# Patient Record
Sex: MALE | Race: WHITE | NOT HISPANIC OR LATINO | Employment: FULL TIME | URBAN - METROPOLITAN AREA
[De-identification: names, ages, dates, MRNs, and addresses within clinical notes are randomized per-mention and may not be internally consistent; named-entity substitution may affect disease eponyms.]

---

## 2019-10-03 ENCOUNTER — OFFICE VISIT (OUTPATIENT)
Dept: FAMILY MEDICINE | Facility: CLINIC | Age: 34
End: 2019-10-03
Payer: COMMERCIAL

## 2019-10-03 VITALS
HEIGHT: 71 IN | DIASTOLIC BLOOD PRESSURE: 88 MMHG | HEART RATE: 74 BPM | BODY MASS INDEX: 29.17 KG/M2 | WEIGHT: 208.38 LBS | SYSTOLIC BLOOD PRESSURE: 126 MMHG | OXYGEN SATURATION: 97 %

## 2019-10-03 DIAGNOSIS — K21.9 GASTROESOPHAGEAL REFLUX DISEASE, ESOPHAGITIS PRESENCE NOT SPECIFIED: ICD-10-CM

## 2019-10-03 DIAGNOSIS — F51.01 PRIMARY INSOMNIA: ICD-10-CM

## 2019-10-03 DIAGNOSIS — R00.2 PALPITATIONS: Primary | ICD-10-CM

## 2019-10-03 PROCEDURE — 99214 OFFICE O/P EST MOD 30 MIN: CPT | Mod: S$GLB,,, | Performed by: PHYSICIAN ASSISTANT

## 2019-10-03 PROCEDURE — 99214 PR OFFICE/OUTPT VISIT, EST, LEVL IV, 30-39 MIN: ICD-10-PCS | Mod: S$GLB,,, | Performed by: PHYSICIAN ASSISTANT

## 2019-10-03 RX ORDER — ALPRAZOLAM 0.25 MG/1
TABLET ORAL
Refills: 0 | COMMUNITY
Start: 2019-08-29 | End: 2019-12-12 | Stop reason: SDUPTHER

## 2019-10-03 NOTE — PROGRESS NOTES
"  SUBJECTIVE:    Patient ID: Alejo Zafar is a 33 y.o. male.    Chief Complaint: Irregular Heart Beat; Cough; and Insomnia    34 yo wm presents for regular checkup. He reports that he has had a constellation of sxs that has him concerned. He admits to some irregular HR and palpitations at times. He feels like his heart stops and then he catches himself. Has seen "spots". He does not have any overt CP. Hx of GERD and he knows what this feels like. Also admits that he continues to have trouble sleeping. Has tried and failed several medications. Mild anxiolytic has helped him in the past. Wishes for something to help him "shut his mind off".      No visits with results within 6 Month(s) from this visit.   Latest known visit with results is:   No results found for any previous visit.       Past Medical History:   Diagnosis Date    Hyperlipidemia     Hypertension      Past Surgical History:   Procedure Laterality Date    WISDOM TOOTH EXTRACTION       Family History   Problem Relation Age of Onset    Stroke Mother     Heart disease Maternal Grandfather        Marital Status:   Alcohol History:  reports that he drinks alcohol.  Tobacco History:  reports that he has quit smoking. His smoking use included cigarettes. He quit after 15.00 years of use. He has never used smokeless tobacco.  Drug History:  reports that he has current or past drug history. Drug: Marijuana.    Review of patient's allergies indicates:  No Known Allergies    Current Outpatient Medications:     ALPRAZolam (XANAX) 0.25 MG tablet, TAKE 1 TABLET BY MOUTH TWICE DAILY AS NEEDED FOR 30 DAYS, Disp: , Rfl: 0    omega-3 fatty acids-fish oil 340-1,000 mg Cap, Take 1 capsule by mouth., Disp: , Rfl:     Review of Systems   Constitutional: Negative for activity change, fatigue, fever and unexpected weight change.   HENT: Negative for congestion.    Respiratory: Negative for apnea, cough, chest tightness and shortness of breath.    Cardiovascular: " "Negative for chest pain and palpitations.   Gastrointestinal: Negative for abdominal distention and abdominal pain.   Genitourinary: Negative for difficulty urinating and dysuria.   Musculoskeletal: Negative for arthralgias and back pain.   Neurological: Negative for dizziness and weakness.          Objective:      Vitals:    10/03/19 1118   BP: 126/88   Pulse: 74   SpO2: 97%   Weight: 94.5 kg (208 lb 6.4 oz)   Height: 5' 10.5" (1.791 m)     Physical Exam   Constitutional: He is oriented to person, place, and time. He appears well-developed and well-nourished. No distress.   HENT:   Head: Normocephalic and atraumatic.   Eyes: Pupils are equal, round, and reactive to light.   Neck: Normal range of motion. Neck supple. No thyromegaly present.   Cardiovascular: Normal rate, regular rhythm, normal heart sounds and intact distal pulses.   Pulmonary/Chest: Effort normal and breath sounds normal.   Abdominal: Soft. Bowel sounds are normal. He exhibits no distension. There is no tenderness.   Musculoskeletal: Normal range of motion.   Neurological: He is alert and oriented to person, place, and time. No cranial nerve deficit.   Skin: Skin is warm and dry. No rash noted. No erythema.         Assessment:       1. Palpitations    2. Primary insomnia    3. Gastroesophageal reflux disease, esophagitis presence not specified         Plan:       Palpitations    Primary insomnia    Gastroesophageal reflux disease, esophagitis presence not specified      No follow-ups on file.        10/3/2019 Freddy Arana PA-C    "

## 2019-10-04 ENCOUNTER — TELEPHONE (OUTPATIENT)
Dept: FAMILY MEDICINE | Facility: CLINIC | Age: 34
End: 2019-10-04

## 2019-10-04 LAB
ALBUMIN SERPL-MCNC: 4.7 G/DL (ref 3.6–5.1)
ALBUMIN/GLOB SERPL: 1.6 (CALC) (ref 1–2.5)
ALP SERPL-CCNC: 44 U/L (ref 40–115)
ALT SERPL-CCNC: 22 U/L (ref 9–46)
AST SERPL-CCNC: 17 U/L (ref 10–40)
BASOPHILS # BLD AUTO: 41 CELLS/UL (ref 0–200)
BASOPHILS NFR BLD AUTO: 1 %
BILIRUB SERPL-MCNC: 1.2 MG/DL (ref 0.2–1.2)
BUN SERPL-MCNC: 16 MG/DL (ref 7–25)
BUN/CREAT SERPL: NORMAL (CALC) (ref 6–22)
CALCIUM SERPL-MCNC: 9.7 MG/DL (ref 8.6–10.3)
CHLORIDE SERPL-SCNC: 104 MMOL/L (ref 98–110)
CHOLEST SERPL-MCNC: 174 MG/DL
CHOLEST/HDLC SERPL: 5 (CALC)
CO2 SERPL-SCNC: 29 MMOL/L (ref 20–32)
CREAT SERPL-MCNC: 0.98 MG/DL (ref 0.6–1.35)
EOSINOPHIL # BLD AUTO: 111 CELLS/UL (ref 15–500)
EOSINOPHIL NFR BLD AUTO: 2.7 %
ERYTHROCYTE [DISTWIDTH] IN BLOOD BY AUTOMATED COUNT: 13.4 % (ref 11–15)
GFRSERPLBLD MDRD-ARVRAT: 100 ML/MIN/1.73M2
GLOBULIN SER CALC-MCNC: 3 G/DL (CALC) (ref 1.9–3.7)
GLUCOSE SERPL-MCNC: 92 MG/DL (ref 65–99)
HCT VFR BLD AUTO: 43.3 % (ref 38.5–50)
HDLC SERPL-MCNC: 35 MG/DL
HGB BLD-MCNC: 14.3 G/DL (ref 13.2–17.1)
LDLC SERPL CALC-MCNC: 119 MG/DL (CALC)
LYMPHOCYTES # BLD AUTO: 1222 CELLS/UL (ref 850–3900)
LYMPHOCYTES NFR BLD AUTO: 29.8 %
MCH RBC QN AUTO: 26.7 PG (ref 27–33)
MCHC RBC AUTO-ENTMCNC: 33 G/DL (ref 32–36)
MCV RBC AUTO: 80.8 FL (ref 80–100)
MONOCYTES # BLD AUTO: 361 CELLS/UL (ref 200–950)
MONOCYTES NFR BLD AUTO: 8.8 %
NEUTROPHILS # BLD AUTO: 2366 CELLS/UL (ref 1500–7800)
NEUTROPHILS NFR BLD AUTO: 57.7 %
NONHDLC SERPL-MCNC: 139 MG/DL (CALC)
PLATELET # BLD AUTO: 238 THOUSAND/UL (ref 140–400)
PMV BLD REES-ECKER: 11.6 FL (ref 7.5–12.5)
POTASSIUM SERPL-SCNC: 4.7 MMOL/L (ref 3.5–5.3)
PROT SERPL-MCNC: 7.7 G/DL (ref 6.1–8.1)
RBC # BLD AUTO: 5.36 MILLION/UL (ref 4.2–5.8)
SERVICE CMNT-IMP: ABNORMAL
SODIUM SERPL-SCNC: 141 MMOL/L (ref 135–146)
TRIGL SERPL-MCNC: 101 MG/DL
TSH SERPL-ACNC: 1.78 MIU/L (ref 0.4–4.5)
WBC # BLD AUTO: 4.1 THOUSAND/UL (ref 3.8–10.8)

## 2019-10-04 NOTE — TELEPHONE ENCOUNTER
Notes recorded by Freddy Arana PA-C on 10/4/2019 at 12:41 PM CDT  Labs all look stable at this time. Let me know how your symptoms do with new medication.  No answer.

## 2019-12-12 ENCOUNTER — TELEPHONE (OUTPATIENT)
Dept: FAMILY MEDICINE | Facility: CLINIC | Age: 34
End: 2019-12-12

## 2019-12-12 DIAGNOSIS — F41.9 ANXIETY: Primary | ICD-10-CM

## 2019-12-12 RX ORDER — ALPRAZOLAM 0.25 MG/1
0.25 TABLET ORAL 2 TIMES DAILY PRN
Qty: 180 TABLET | Refills: 1 | Status: SHIPPED | OUTPATIENT
Start: 2019-12-12 | End: 2021-02-11 | Stop reason: SDUPTHER

## 2019-12-12 NOTE — TELEPHONE ENCOUNTER
----- Message from Dior Robbins sent at 12/12/2019  2:20 PM CST -----  Contact: Hung w/ [facility]   Needs the nurse to give him a call back in regards the alprazolam   Jethro's# [phone]

## 2019-12-12 NOTE — TELEPHONE ENCOUNTER
----- Message from Dior Robbins sent at 12/12/2019  9:16 AM CST -----  Contact: Alejo Zafar  Needs refill on ALPRAZolam (XANAX) 0.25 MG tablet  [facility] in [facility]   Pt# [phone]

## 2019-12-12 NOTE — TELEPHONE ENCOUNTER
Contacted Jethro at [facility] pharmacy.  He wanted to verify that we see the pt, because pt lives in [place] but is having meds filled in [place].  Advised Jethro of last visit and advised maybe he works in [place], so that may be more convenient for him.  Jethro is going to contact patient.

## 2020-09-21 ENCOUNTER — OFFICE VISIT (OUTPATIENT)
Dept: OPTOMETRY | Facility: CLINIC | Age: 35
End: 2020-09-21
Payer: COMMERCIAL

## 2020-09-21 DIAGNOSIS — H57.11 EYE PAIN, RIGHT: ICD-10-CM

## 2020-09-21 DIAGNOSIS — H15.101 EPISCLERITIS OF RIGHT EYE: Primary | ICD-10-CM

## 2020-09-21 PROCEDURE — 92002 INTRM OPH EXAM NEW PATIENT: CPT | Mod: S$GLB,,, | Performed by: OPTOMETRIST

## 2020-09-21 PROCEDURE — 99999 PR PBB SHADOW E&M-EST. PATIENT-LVL III: ICD-10-PCS | Mod: PBBFAC,,, | Performed by: OPTOMETRIST

## 2020-09-21 PROCEDURE — 99999 PR PBB SHADOW E&M-EST. PATIENT-LVL III: CPT | Mod: PBBFAC,,, | Performed by: OPTOMETRIST

## 2020-09-21 PROCEDURE — 92002 PR EYE EXAM, NEW PATIENT,INTERMED: ICD-10-PCS | Mod: S$GLB,,, | Performed by: OPTOMETRIST

## 2020-09-21 NOTE — PATIENT INSTRUCTIONS
Mr. Zafar presents with signs/symptoms in the right eye consistent with episcleritis.  No discharge.  No pre-auricular swelling of lymph node.    Discussed management.  Suggest take Aleve or Ibuprofen by mouth as directed on package for ten to fourteen days, and use of an OTC artificial tear eyedrop in the right eye as needed throughout the day.  Call/return in ten to fourteen days if signs/symptoms not resolved - or prior, if any apparent exacerbation of signs/symptoms.     Defer decision for use of a topical steroid drop.

## 2020-09-21 NOTE — PROGRESS NOTES
HPI     Eye Pain      Additional comments: redness of inferior conj od and not clearing   x2w/now having soreness of inferior aspect of eye              Comments     Patient's age: 34 y.o. WM   Occupation: [employer]  Approximate date of last eye examination:    Name of last eye doctor seen:   City/State:   Wears glasses? No      If yes, wears  Full-time or part-time?    Present glasses are: Bifocal, SV Distance, SV Reading?    Approximate age of present glasses:     Got new glasses following last exam, or subsequently?:     Any problem with VA with glasses?  Blur va OD   Wears CLs?:  No            If yes:              Type of CL worn:                Wears full-time or part-time:                 Sleeps with contact lenses:                 CL Solution used:                 How often replace CLs:                Any problem with VA with CLs?                Has patient read and signed the contact lens fee information   document?   Headaches?  No   Eye pain/discomfort?  More tender                                                                                      Flashes?  No   Floaters?  No   Diplopia/Double vision?  No   Patient's Ocular History:         Any eye surgeries? No          Any eye injury?  Childhood injury          Any treatment for eye disease?  No   Family history of eye disease?  MGM-Glaucoma   Significant patient medical history:         1. Diabetes?  No        If yes, IDDM or NIDDM?    2. HBP?  No               3. Other (describe):  No    ! OTC eyedrops currently using:  No    ! Prescription eye meds currently using:  No    ! Any history of allergy/adverse reaction to any eye meds used   previously?  N/a     ! Any history of allergy/adverse reaction to eyedrops used during prior   eye exam(s)? n/a    ! Any history of allergy/adverse reaction to Novacaine or similar meds?   n/a   ! Any history of allergy/adverse reaction to Epinephrine or similar meds?   N/a     ! Patient okay with use of anesthetic  eyedrops to check eye pressure?    Yes         ! Patient okay with use of eyedrops to dilate pupils today?  Yes    !  Allergies/Medications/Medical History/Family History reviewed today?    yes      PD =     Desired reading distance =    Approx computer distance =                                                                     Last edited by Kristofer Kern, OD on 9/21/2020  4:05 PM. (History)            Assessment /Plan     For exam results, see Encounter Report.    1. Episcleritis of right eye     2. Eye pain, right                    Mr. Zafar presents with signs/symptoms in the right eye consistent with episcleritis.  No discharge.  No pre-auricular swelling of lymph node.    Discussed management.  Suggest take Aleve or Ibuprofen by mouth as directed on package for ten to fourteen days, and use of an OTC artificial tear eyedrop in the right eye as needed throughout the day.  Call/return in ten to fourteen days if signs/symptoms not resolved - or prior, if any apparent exacerbation of signs/symptoms.     Defer decision for use of a topical steroid drop.

## 2021-02-08 ENCOUNTER — TELEPHONE (OUTPATIENT)
Dept: FAMILY MEDICINE | Facility: CLINIC | Age: 36
End: 2021-02-08

## 2021-02-08 DIAGNOSIS — F41.9 ANXIETY: ICD-10-CM

## 2021-02-08 RX ORDER — ALPRAZOLAM 0.25 MG/1
0.25 TABLET ORAL 2 TIMES DAILY PRN
Qty: 180 TABLET | Refills: 1 | Status: CANCELLED | OUTPATIENT
Start: 2021-02-08 | End: 2021-08-07

## 2021-02-11 ENCOUNTER — OFFICE VISIT (OUTPATIENT)
Dept: INTERNAL MEDICINE | Facility: CLINIC | Age: 36
End: 2021-02-11
Payer: COMMERCIAL

## 2021-02-11 ENCOUNTER — LAB VISIT (OUTPATIENT)
Dept: LAB | Facility: HOSPITAL | Age: 36
End: 2021-02-11
Attending: INTERNAL MEDICINE
Payer: COMMERCIAL

## 2021-02-11 VITALS
SYSTOLIC BLOOD PRESSURE: 122 MMHG | BODY MASS INDEX: 29.05 KG/M2 | WEIGHT: 202.94 LBS | OXYGEN SATURATION: 97 % | DIASTOLIC BLOOD PRESSURE: 76 MMHG | HEART RATE: 75 BPM | HEIGHT: 70 IN

## 2021-02-11 DIAGNOSIS — Z00.00 ANNUAL PHYSICAL EXAM: ICD-10-CM

## 2021-02-11 DIAGNOSIS — G47.01 INSOMNIA DUE TO MEDICAL CONDITION: ICD-10-CM

## 2021-02-11 DIAGNOSIS — Z00.00 ANNUAL PHYSICAL EXAM: Primary | ICD-10-CM

## 2021-02-11 DIAGNOSIS — R00.2 PALPITATIONS: ICD-10-CM

## 2021-02-11 DIAGNOSIS — F42.8 OBSESSIVE THINKING: ICD-10-CM

## 2021-02-11 DIAGNOSIS — F41.9 ANXIETY: ICD-10-CM

## 2021-02-11 LAB
ALBUMIN SERPL BCP-MCNC: 4.6 G/DL (ref 3.5–5.2)
ALP SERPL-CCNC: 45 U/L (ref 55–135)
ALT SERPL W/O P-5'-P-CCNC: 23 U/L (ref 10–44)
ANION GAP SERPL CALC-SCNC: 9 MMOL/L (ref 8–16)
AST SERPL-CCNC: 18 U/L (ref 10–40)
BASOPHILS # BLD AUTO: 0.05 K/UL (ref 0–0.2)
BASOPHILS NFR BLD: 0.9 % (ref 0–1.9)
BILIRUB SERPL-MCNC: 1.8 MG/DL (ref 0.1–1)
BUN SERPL-MCNC: 9 MG/DL (ref 6–20)
CALCIUM SERPL-MCNC: 9.4 MG/DL (ref 8.7–10.5)
CHLORIDE SERPL-SCNC: 103 MMOL/L (ref 95–110)
CHOLEST SERPL-MCNC: 201 MG/DL (ref 120–199)
CHOLEST/HDLC SERPL: 6.9 {RATIO} (ref 2–5)
CO2 SERPL-SCNC: 27 MMOL/L (ref 23–29)
CREAT SERPL-MCNC: 1.1 MG/DL (ref 0.5–1.4)
DIFFERENTIAL METHOD: ABNORMAL
EOSINOPHIL # BLD AUTO: 0 K/UL (ref 0–0.5)
EOSINOPHIL NFR BLD: 0 % (ref 0–8)
ERYTHROCYTE [DISTWIDTH] IN BLOOD BY AUTOMATED COUNT: 14.6 % (ref 11.5–14.5)
EST. GFR  (AFRICAN AMERICAN): >60 ML/MIN/1.73 M^2
EST. GFR  (NON AFRICAN AMERICAN): >60 ML/MIN/1.73 M^2
GLUCOSE SERPL-MCNC: 86 MG/DL (ref 70–110)
HCT VFR BLD AUTO: 42.9 % (ref 40–54)
HDLC SERPL-MCNC: 29 MG/DL (ref 40–75)
HDLC SERPL: 14.4 % (ref 20–50)
HGB BLD-MCNC: 14.1 G/DL (ref 14–18)
IMM GRANULOCYTES # BLD AUTO: 0.02 K/UL (ref 0–0.04)
IMM GRANULOCYTES NFR BLD AUTO: 0.4 % (ref 0–0.5)
LDLC SERPL CALC-MCNC: 141.8 MG/DL (ref 63–159)
LYMPHOCYTES # BLD AUTO: 1.1 K/UL (ref 1–4.8)
LYMPHOCYTES NFR BLD: 19.5 % (ref 18–48)
MCH RBC QN AUTO: 26.1 PG (ref 27–31)
MCHC RBC AUTO-ENTMCNC: 32.9 G/DL (ref 32–36)
MCV RBC AUTO: 79 FL (ref 82–98)
MONOCYTES # BLD AUTO: 0.4 K/UL (ref 0.3–1)
MONOCYTES NFR BLD: 7.5 % (ref 4–15)
NEUTROPHILS # BLD AUTO: 3.9 K/UL (ref 1.8–7.7)
NEUTROPHILS NFR BLD: 71.7 % (ref 38–73)
NONHDLC SERPL-MCNC: 172 MG/DL
NRBC BLD-RTO: 0 /100 WBC
PLATELET # BLD AUTO: 270 K/UL (ref 150–350)
PMV BLD AUTO: 12.4 FL (ref 9.2–12.9)
POTASSIUM SERPL-SCNC: 4.1 MMOL/L (ref 3.5–5.1)
PROT SERPL-MCNC: 7.9 G/DL (ref 6–8.4)
RBC # BLD AUTO: 5.4 M/UL (ref 4.6–6.2)
SODIUM SERPL-SCNC: 139 MMOL/L (ref 136–145)
TRIGL SERPL-MCNC: 151 MG/DL (ref 30–150)
WBC # BLD AUTO: 5.48 K/UL (ref 3.9–12.7)

## 2021-02-11 PROCEDURE — 99385 PR PREVENTIVE VISIT,NEW,18-39: ICD-10-PCS | Mod: S$GLB,,, | Performed by: INTERNAL MEDICINE

## 2021-02-11 PROCEDURE — 1126F PR PAIN SEVERITY QUANTIFIED, NO PAIN PRESENT: ICD-10-PCS | Mod: S$GLB,,, | Performed by: INTERNAL MEDICINE

## 2021-02-11 PROCEDURE — 80053 COMPREHEN METABOLIC PANEL: CPT

## 2021-02-11 PROCEDURE — 99385 PREV VISIT NEW AGE 18-39: CPT | Mod: S$GLB,,, | Performed by: INTERNAL MEDICINE

## 2021-02-11 PROCEDURE — 80061 LIPID PANEL: CPT

## 2021-02-11 PROCEDURE — 3008F PR BODY MASS INDEX (BMI) DOCUMENTED: ICD-10-PCS | Mod: CPTII,S$GLB,, | Performed by: INTERNAL MEDICINE

## 2021-02-11 PROCEDURE — 36415 COLL VENOUS BLD VENIPUNCTURE: CPT

## 2021-02-11 PROCEDURE — 3008F BODY MASS INDEX DOCD: CPT | Mod: CPTII,S$GLB,, | Performed by: INTERNAL MEDICINE

## 2021-02-11 PROCEDURE — 1126F AMNT PAIN NOTED NONE PRSNT: CPT | Mod: S$GLB,,, | Performed by: INTERNAL MEDICINE

## 2021-02-11 PROCEDURE — 99999 PR PBB SHADOW E&M-EST. PATIENT-LVL IV: CPT | Mod: PBBFAC,,, | Performed by: INTERNAL MEDICINE

## 2021-02-11 PROCEDURE — 85025 COMPLETE CBC W/AUTO DIFF WBC: CPT

## 2021-02-11 PROCEDURE — 99999 PR PBB SHADOW E&M-EST. PATIENT-LVL IV: ICD-10-PCS | Mod: PBBFAC,,, | Performed by: INTERNAL MEDICINE

## 2021-02-11 RX ORDER — ALPRAZOLAM 0.25 MG/1
0.25 TABLET ORAL 2 TIMES DAILY PRN
Qty: 30 TABLET | Refills: 1 | Status: SHIPPED | OUTPATIENT
Start: 2021-02-11 | End: 2021-09-14 | Stop reason: SDUPTHER

## 2021-03-25 ENCOUNTER — LAB VISIT (OUTPATIENT)
Dept: INTERNAL MEDICINE | Facility: CLINIC | Age: 36
End: 2021-03-25
Payer: COMMERCIAL

## 2021-03-25 ENCOUNTER — OFFICE VISIT (OUTPATIENT)
Dept: INTERNAL MEDICINE | Facility: CLINIC | Age: 36
End: 2021-03-25
Payer: COMMERCIAL

## 2021-03-25 DIAGNOSIS — B34.9 VIRAL ILLNESS: ICD-10-CM

## 2021-03-25 DIAGNOSIS — R50.9 FEVER, UNSPECIFIED FEVER CAUSE: ICD-10-CM

## 2021-03-25 DIAGNOSIS — R50.9 FEVER, UNSPECIFIED FEVER CAUSE: Primary | ICD-10-CM

## 2021-03-25 PROCEDURE — U0005 INFEC AGEN DETEC AMPLI PROBE: HCPCS | Performed by: INTERNAL MEDICINE

## 2021-03-25 PROCEDURE — U0003 INFECTIOUS AGENT DETECTION BY NUCLEIC ACID (DNA OR RNA); SEVERE ACUTE RESPIRATORY SYNDROME CORONAVIRUS 2 (SARS-COV-2) (CORONAVIRUS DISEASE [COVID-19]), AMPLIFIED PROBE TECHNIQUE, MAKING USE OF HIGH THROUGHPUT TECHNOLOGIES AS DESCRIBED BY CMS-2020-01-R: HCPCS | Performed by: INTERNAL MEDICINE

## 2021-03-25 PROCEDURE — 99213 PR OFFICE/OUTPT VISIT, EST, LEVL III, 20-29 MIN: ICD-10-PCS | Mod: 95,,, | Performed by: INTERNAL MEDICINE

## 2021-03-25 PROCEDURE — 99213 OFFICE O/P EST LOW 20 MIN: CPT | Mod: 95,,, | Performed by: INTERNAL MEDICINE

## 2021-03-26 LAB — SARS-COV-2 RNA RESP QL NAA+PROBE: NOT DETECTED

## 2021-05-10 ENCOUNTER — PATIENT MESSAGE (OUTPATIENT)
Dept: RESEARCH | Facility: HOSPITAL | Age: 36
End: 2021-05-10

## 2021-06-09 ENCOUNTER — OFFICE VISIT (OUTPATIENT)
Dept: PODIATRY | Facility: CLINIC | Age: 36
End: 2021-06-09
Payer: COMMERCIAL

## 2021-06-09 VITALS
HEART RATE: 72 BPM | DIASTOLIC BLOOD PRESSURE: 74 MMHG | SYSTOLIC BLOOD PRESSURE: 120 MMHG | HEIGHT: 70 IN | BODY MASS INDEX: 29.12 KG/M2

## 2021-06-09 DIAGNOSIS — L60.0 INGROWN NAIL: Primary | ICD-10-CM

## 2021-06-09 PROCEDURE — 3008F BODY MASS INDEX DOCD: CPT | Mod: CPTII,S$GLB,, | Performed by: PODIATRIST

## 2021-06-09 PROCEDURE — 1126F PR PAIN SEVERITY QUANTIFIED, NO PAIN PRESENT: ICD-10-PCS | Mod: S$GLB,,, | Performed by: PODIATRIST

## 2021-06-09 PROCEDURE — 3008F PR BODY MASS INDEX (BMI) DOCUMENTED: ICD-10-PCS | Mod: CPTII,S$GLB,, | Performed by: PODIATRIST

## 2021-06-09 PROCEDURE — 1126F AMNT PAIN NOTED NONE PRSNT: CPT | Mod: S$GLB,,, | Performed by: PODIATRIST

## 2021-06-09 PROCEDURE — 99203 PR OFFICE/OUTPT VISIT, NEW, LEVL III, 30-44 MIN: ICD-10-PCS | Mod: S$GLB,,, | Performed by: PODIATRIST

## 2021-06-09 PROCEDURE — 99999 PR PBB SHADOW E&M-EST. PATIENT-LVL III: CPT | Mod: PBBFAC,,, | Performed by: PODIATRIST

## 2021-06-09 PROCEDURE — 99203 OFFICE O/P NEW LOW 30 MIN: CPT | Mod: S$GLB,,, | Performed by: PODIATRIST

## 2021-06-09 PROCEDURE — 99999 PR PBB SHADOW E&M-EST. PATIENT-LVL III: ICD-10-PCS | Mod: PBBFAC,,, | Performed by: PODIATRIST

## 2021-06-10 ENCOUNTER — TELEPHONE (OUTPATIENT)
Dept: PODIATRY | Facility: CLINIC | Age: 36
End: 2021-06-10

## 2021-06-17 ENCOUNTER — TELEPHONE (OUTPATIENT)
Dept: PODIATRY | Facility: CLINIC | Age: 36
End: 2021-06-17

## 2021-07-13 ENCOUNTER — TELEPHONE (OUTPATIENT)
Dept: PODIATRY | Facility: CLINIC | Age: 36
End: 2021-07-13

## 2021-07-14 ENCOUNTER — PROCEDURE VISIT (OUTPATIENT)
Dept: PODIATRY | Facility: CLINIC | Age: 36
End: 2021-07-14
Payer: COMMERCIAL

## 2021-07-14 VITALS
HEIGHT: 70 IN | DIASTOLIC BLOOD PRESSURE: 84 MMHG | BODY MASS INDEX: 29.12 KG/M2 | SYSTOLIC BLOOD PRESSURE: 144 MMHG | HEART RATE: 72 BPM

## 2021-07-14 DIAGNOSIS — L60.0 INGROWN NAIL: Primary | ICD-10-CM

## 2021-07-22 ENCOUNTER — OFFICE VISIT (OUTPATIENT)
Dept: PODIATRY | Facility: CLINIC | Age: 36
End: 2021-07-22
Payer: COMMERCIAL

## 2021-07-22 VITALS
BODY MASS INDEX: 29.1 KG/M2 | WEIGHT: 202.81 LBS | SYSTOLIC BLOOD PRESSURE: 120 MMHG | HEART RATE: 80 BPM | DIASTOLIC BLOOD PRESSURE: 78 MMHG

## 2021-07-22 DIAGNOSIS — L60.0 INGROWN NAIL: Primary | ICD-10-CM

## 2021-07-22 PROCEDURE — 99024 PR POST-OP FOLLOW-UP VISIT: ICD-10-PCS | Mod: S$GLB,,, | Performed by: PODIATRIST

## 2021-07-22 PROCEDURE — 99999 PR PBB SHADOW E&M-EST. PATIENT-LVL III: CPT | Mod: PBBFAC,,, | Performed by: PODIATRIST

## 2021-07-22 PROCEDURE — 3078F DIAST BP <80 MM HG: CPT | Mod: CPTII,S$GLB,, | Performed by: PODIATRIST

## 2021-07-22 PROCEDURE — 3008F BODY MASS INDEX DOCD: CPT | Mod: CPTII,S$GLB,, | Performed by: PODIATRIST

## 2021-07-22 PROCEDURE — 3074F SYST BP LT 130 MM HG: CPT | Mod: CPTII,S$GLB,, | Performed by: PODIATRIST

## 2021-07-22 PROCEDURE — 3008F PR BODY MASS INDEX (BMI) DOCUMENTED: ICD-10-PCS | Mod: CPTII,S$GLB,, | Performed by: PODIATRIST

## 2021-07-22 PROCEDURE — 99024 POSTOP FOLLOW-UP VISIT: CPT | Mod: S$GLB,,, | Performed by: PODIATRIST

## 2021-07-22 PROCEDURE — 99999 PR PBB SHADOW E&M-EST. PATIENT-LVL III: ICD-10-PCS | Mod: PBBFAC,,, | Performed by: PODIATRIST

## 2021-07-22 PROCEDURE — 3078F PR MOST RECENT DIASTOLIC BLOOD PRESSURE < 80 MM HG: ICD-10-PCS | Mod: CPTII,S$GLB,, | Performed by: PODIATRIST

## 2021-07-22 PROCEDURE — 3074F PR MOST RECENT SYSTOLIC BLOOD PRESSURE < 130 MM HG: ICD-10-PCS | Mod: CPTII,S$GLB,, | Performed by: PODIATRIST

## 2021-07-30 ENCOUNTER — OFFICE VISIT (OUTPATIENT)
Dept: DERMATOLOGY | Facility: CLINIC | Age: 36
End: 2021-07-30
Payer: COMMERCIAL

## 2021-07-30 DIAGNOSIS — D22.9 ATYPICAL NEVUS: ICD-10-CM

## 2021-07-30 DIAGNOSIS — D22.9 MULTIPLE BENIGN NEVI: ICD-10-CM

## 2021-07-30 DIAGNOSIS — L73.9 FOLLICULITIS: ICD-10-CM

## 2021-07-30 DIAGNOSIS — D18.01 CHERRY ANGIOMA: ICD-10-CM

## 2021-07-30 DIAGNOSIS — A63.0 GENITAL WARTS: Primary | ICD-10-CM

## 2021-07-30 DIAGNOSIS — Z12.83 SCREENING EXAM FOR SKIN CANCER: ICD-10-CM

## 2021-07-30 PROCEDURE — 1159F MED LIST DOCD IN RCRD: CPT | Mod: CPTII,S$GLB,, | Performed by: DERMATOLOGY

## 2021-07-30 PROCEDURE — 54056 CRYOSURGERY PENIS LESION(S): CPT | Mod: S$GLB,,, | Performed by: DERMATOLOGY

## 2021-07-30 PROCEDURE — 1159F PR MEDICATION LIST DOCUMENTED IN MEDICAL RECORD: ICD-10-PCS | Mod: CPTII,S$GLB,, | Performed by: DERMATOLOGY

## 2021-07-30 PROCEDURE — 99999 PR PBB SHADOW E&M-EST. PATIENT-LVL III: ICD-10-PCS | Mod: PBBFAC,,, | Performed by: DERMATOLOGY

## 2021-07-30 PROCEDURE — 54056 PR DESTR PENIS LESN,SIMPL,CRYOSURG: ICD-10-PCS | Mod: S$GLB,,, | Performed by: DERMATOLOGY

## 2021-07-30 PROCEDURE — 1160F RVW MEDS BY RX/DR IN RCRD: CPT | Mod: CPTII,S$GLB,, | Performed by: DERMATOLOGY

## 2021-07-30 PROCEDURE — 1160F PR REVIEW ALL MEDS BY PRESCRIBER/CLIN PHARMACIST DOCUMENTED: ICD-10-PCS | Mod: CPTII,S$GLB,, | Performed by: DERMATOLOGY

## 2021-07-30 PROCEDURE — 99204 OFFICE O/P NEW MOD 45 MIN: CPT | Mod: 25,S$GLB,, | Performed by: DERMATOLOGY

## 2021-07-30 PROCEDURE — 1126F PR PAIN SEVERITY QUANTIFIED, NO PAIN PRESENT: ICD-10-PCS | Mod: CPTII,S$GLB,, | Performed by: DERMATOLOGY

## 2021-07-30 PROCEDURE — 1126F AMNT PAIN NOTED NONE PRSNT: CPT | Mod: CPTII,S$GLB,, | Performed by: DERMATOLOGY

## 2021-07-30 PROCEDURE — 99999 PR PBB SHADOW E&M-EST. PATIENT-LVL III: CPT | Mod: PBBFAC,,, | Performed by: DERMATOLOGY

## 2021-07-30 PROCEDURE — 99204 PR OFFICE/OUTPT VISIT, NEW, LEVL IV, 45-59 MIN: ICD-10-PCS | Mod: 25,S$GLB,, | Performed by: DERMATOLOGY

## 2021-07-30 RX ORDER — CLINDAMYCIN PHOSPHATE 11.9 MG/ML
SOLUTION TOPICAL
Qty: 60 ML | Refills: 3 | Status: SHIPPED | OUTPATIENT
Start: 2021-07-30 | End: 2021-09-14

## 2021-07-30 RX ORDER — IMIQUIMOD 12.5 MG/.25G
CREAM TOPICAL
Qty: 24 PACKET | Refills: 2 | Status: SHIPPED | OUTPATIENT
Start: 2021-07-30 | End: 2021-09-14

## 2021-09-14 ENCOUNTER — OFFICE VISIT (OUTPATIENT)
Dept: FAMILY MEDICINE | Facility: CLINIC | Age: 36
End: 2021-09-14
Payer: COMMERCIAL

## 2021-09-14 VITALS
WEIGHT: 215 LBS | HEIGHT: 70 IN | BODY MASS INDEX: 30.78 KG/M2 | HEART RATE: 100 BPM | DIASTOLIC BLOOD PRESSURE: 70 MMHG | SYSTOLIC BLOOD PRESSURE: 138 MMHG

## 2021-09-14 DIAGNOSIS — R07.89 SENSATION OF CHEST TIGHTNESS: Primary | ICD-10-CM

## 2021-09-14 DIAGNOSIS — F41.9 ANXIETY: ICD-10-CM

## 2021-09-14 DIAGNOSIS — Z00.00 ROUTINE PHYSICAL EXAMINATION: ICD-10-CM

## 2021-09-14 PROCEDURE — 1160F RVW MEDS BY RX/DR IN RCRD: CPT | Mod: S$GLB,,, | Performed by: PHYSICIAN ASSISTANT

## 2021-09-14 PROCEDURE — 3008F PR BODY MASS INDEX (BMI) DOCUMENTED: ICD-10-PCS | Mod: S$GLB,,, | Performed by: PHYSICIAN ASSISTANT

## 2021-09-14 PROCEDURE — 99214 OFFICE O/P EST MOD 30 MIN: CPT | Mod: S$GLB,,, | Performed by: PHYSICIAN ASSISTANT

## 2021-09-14 PROCEDURE — 99214 PR OFFICE/OUTPT VISIT, EST, LEVL IV, 30-39 MIN: ICD-10-PCS | Mod: S$GLB,,, | Performed by: PHYSICIAN ASSISTANT

## 2021-09-14 PROCEDURE — 3008F BODY MASS INDEX DOCD: CPT | Mod: S$GLB,,, | Performed by: PHYSICIAN ASSISTANT

## 2021-09-14 PROCEDURE — 1160F PR REVIEW ALL MEDS BY PRESCRIBER/CLIN PHARMACIST DOCUMENTED: ICD-10-PCS | Mod: S$GLB,,, | Performed by: PHYSICIAN ASSISTANT

## 2021-09-14 RX ORDER — SERTRALINE HYDROCHLORIDE 25 MG/1
25 TABLET, FILM COATED ORAL DAILY
Qty: 30 TABLET | Refills: 2 | Status: SHIPPED | OUTPATIENT
Start: 2021-09-14 | End: 2021-12-23 | Stop reason: SDUPTHER

## 2021-09-14 RX ORDER — ALPRAZOLAM 0.25 MG/1
0.25 TABLET ORAL 2 TIMES DAILY PRN
Qty: 30 TABLET | Refills: 2 | Status: SHIPPED | OUTPATIENT
Start: 2021-09-14 | End: 2021-12-15 | Stop reason: SDUPTHER

## 2021-09-16 ENCOUNTER — TELEPHONE (OUTPATIENT)
Dept: CARDIOLOGY | Facility: HOSPITAL | Age: 36
End: 2021-09-16

## 2021-09-17 ENCOUNTER — CLINICAL SUPPORT (OUTPATIENT)
Dept: CARDIOLOGY | Facility: HOSPITAL | Age: 36
End: 2021-09-17
Attending: PHYSICIAN ASSISTANT
Payer: COMMERCIAL

## 2021-09-17 VITALS — WEIGHT: 213 LBS | HEIGHT: 71 IN | BODY MASS INDEX: 29.82 KG/M2

## 2021-09-17 DIAGNOSIS — F41.9 ANXIETY: ICD-10-CM

## 2021-09-17 DIAGNOSIS — R07.89 SENSATION OF CHEST TIGHTNESS: ICD-10-CM

## 2021-09-17 LAB
CV STRESS BASE HR: 79 BPM
DIASTOLIC BLOOD PRESSURE: 85 MMHG
OHS CV CPX 1 MINUTE RECOVERY HEART RATE: 147 BPM
OHS CV CPX 85 PERCENT MAX PREDICTED HEART RATE MALE: 156
OHS CV CPX ESTIMATED METS: 10.3
OHS CV CPX MAX PREDICTED HEART RATE: 184
OHS CV CPX PATIENT IS FEMALE: 0
OHS CV CPX PATIENT IS MALE: 1
OHS CV CPX PEAK DIASTOLIC BLOOD PRESSURE: 74 MMHG
OHS CV CPX PEAK HEAR RATE: 162 BPM
OHS CV CPX PEAK RATE PRESSURE PRODUCT: NORMAL
OHS CV CPX PEAK SYSTOLIC BLOOD PRESSURE: 201 MMHG
OHS CV CPX PERCENT MAX PREDICTED HEART RATE ACHIEVED: 88
OHS CV CPX RATE PRESSURE PRODUCT PRESENTING: NORMAL
STRESS ECHO POST EXERCISE DUR MIN: 9 MINUTES
STRESS ECHO POST EXERCISE DUR SEC: 0 SECONDS
SYSTOLIC BLOOD PRESSURE: 131 MMHG

## 2021-09-17 PROCEDURE — 93017 CV STRESS TEST TRACING ONLY: CPT

## 2021-09-17 PROCEDURE — 93018 EXERCISE STRESS - EKG (CUPID ONLY): ICD-10-PCS | Mod: ,,, | Performed by: INTERNAL MEDICINE

## 2021-09-17 PROCEDURE — 93016 CV STRESS TEST SUPVJ ONLY: CPT | Mod: ,,, | Performed by: INTERNAL MEDICINE

## 2021-09-17 PROCEDURE — 93018 CV STRESS TEST I&R ONLY: CPT | Mod: ,,, | Performed by: INTERNAL MEDICINE

## 2021-09-17 PROCEDURE — 93016 EXERCISE STRESS - EKG (CUPID ONLY): ICD-10-PCS | Mod: ,,, | Performed by: INTERNAL MEDICINE

## 2021-10-08 ENCOUNTER — PATIENT MESSAGE (OUTPATIENT)
Dept: FAMILY MEDICINE | Facility: CLINIC | Age: 36
End: 2021-10-08

## 2021-10-16 ENCOUNTER — PATIENT MESSAGE (OUTPATIENT)
Dept: FAMILY MEDICINE | Facility: CLINIC | Age: 36
End: 2021-10-16
Payer: COMMERCIAL

## 2021-12-03 ENCOUNTER — PATIENT MESSAGE (OUTPATIENT)
Dept: FAMILY MEDICINE | Facility: CLINIC | Age: 36
End: 2021-12-03
Payer: COMMERCIAL

## 2021-12-03 DIAGNOSIS — N52.9 ERECTILE DYSFUNCTION, UNSPECIFIED ERECTILE DYSFUNCTION TYPE: Primary | ICD-10-CM

## 2021-12-03 RX ORDER — TADALAFIL 20 MG/1
20 TABLET ORAL DAILY
Qty: 12 TABLET | Refills: 2 | Status: SHIPPED | OUTPATIENT
Start: 2021-12-03 | End: 2022-04-18 | Stop reason: SDUPTHER

## 2021-12-07 ENCOUNTER — PATIENT MESSAGE (OUTPATIENT)
Dept: DERMATOLOGY | Facility: CLINIC | Age: 36
End: 2021-12-07
Payer: COMMERCIAL

## 2021-12-13 ENCOUNTER — TELEPHONE (OUTPATIENT)
Dept: FAMILY MEDICINE | Facility: CLINIC | Age: 36
End: 2021-12-13
Payer: COMMERCIAL

## 2021-12-23 ENCOUNTER — OFFICE VISIT (OUTPATIENT)
Dept: FAMILY MEDICINE | Facility: CLINIC | Age: 36
End: 2021-12-23

## 2021-12-23 ENCOUNTER — TELEPHONE (OUTPATIENT)
Dept: FAMILY MEDICINE | Facility: CLINIC | Age: 36
End: 2021-12-23

## 2021-12-23 DIAGNOSIS — H91.90 HEARING LOSS, UNSPECIFIED HEARING LOSS TYPE, UNSPECIFIED LATERALITY: ICD-10-CM

## 2021-12-23 DIAGNOSIS — Z00.00 ROUTINE PHYSICAL EXAMINATION: ICD-10-CM

## 2021-12-23 DIAGNOSIS — K64.9 HEMORRHOIDS, UNSPECIFIED HEMORRHOID TYPE: ICD-10-CM

## 2021-12-23 DIAGNOSIS — F41.9 ANXIETY: Primary | ICD-10-CM

## 2021-12-23 DIAGNOSIS — N52.9 ERECTILE DYSFUNCTION, UNSPECIFIED ERECTILE DYSFUNCTION TYPE: ICD-10-CM

## 2021-12-23 PROCEDURE — 99214 OFFICE O/P EST MOD 30 MIN: CPT | Mod: 95,,, | Performed by: PHYSICIAN ASSISTANT

## 2021-12-23 PROCEDURE — 99214 PR OFFICE/OUTPT VISIT, EST, LEVL IV, 30-39 MIN: ICD-10-PCS | Mod: 95,,, | Performed by: PHYSICIAN ASSISTANT

## 2021-12-23 RX ORDER — SERTRALINE HYDROCHLORIDE 25 MG/1
25 TABLET, FILM COATED ORAL DAILY
Qty: 30 TABLET | Refills: 5 | Status: SHIPPED | OUTPATIENT
Start: 2021-12-23 | End: 2022-05-25 | Stop reason: ALTCHOICE

## 2021-12-23 RX ORDER — HYDROCORTISONE ACETATE PRAMOXINE HCL 1; 1 G/100G; G/100G
CREAM TOPICAL 3 TIMES DAILY
Qty: 28.4 G | Refills: 1 | Status: SHIPPED | OUTPATIENT
Start: 2021-12-23 | End: 2022-01-22

## 2021-12-30 ENCOUNTER — PATIENT MESSAGE (OUTPATIENT)
Dept: FAMILY MEDICINE | Facility: CLINIC | Age: 36
End: 2021-12-30
Payer: COMMERCIAL

## 2021-12-30 DIAGNOSIS — R06.83 SNORING: Primary | ICD-10-CM

## 2022-01-02 ENCOUNTER — PATIENT MESSAGE (OUTPATIENT)
Dept: FAMILY MEDICINE | Facility: CLINIC | Age: 37
End: 2022-01-02
Payer: COMMERCIAL

## 2022-01-03 ENCOUNTER — PATIENT MESSAGE (OUTPATIENT)
Dept: FAMILY MEDICINE | Facility: CLINIC | Age: 37
End: 2022-01-03
Payer: COMMERCIAL

## 2022-01-04 ENCOUNTER — TELEPHONE (OUTPATIENT)
Dept: FAMILY MEDICINE | Facility: CLINIC | Age: 37
End: 2022-01-04
Payer: COMMERCIAL

## 2022-01-04 NOTE — TELEPHONE ENCOUNTER
----- Message from Freddy Arana PA-C sent at 1/4/2022  1:39 PM CST -----  Your labs all look stable except for liver enzymes and cholesterol.  Liver elevations in the current ratios support an increase in alcohol use.  I would advise to significantly cut back on this.  We can recheck a CMP and lipid panel in 3 months

## 2022-01-04 NOTE — TELEPHONE ENCOUNTER
havent heard of any cbd associated liver issues. Just advise to watch the alcohol use and recheck cmp/lipid in 8 weeks. We would consider imaging/further labs if remains high.

## 2022-01-04 NOTE — TELEPHONE ENCOUNTER
Spoke to pt verbatim per federico. Pt voiced understanding. Pt states he only heavily drinks one day a week and did not realize that it could affect his liver. Pt also would like to know if CBD gummies could cause liver problems. Please advise. Remind me set.

## 2022-01-05 LAB
ALBUMIN SERPL-MCNC: 4.5 G/DL (ref 3.6–5.1)
ALBUMIN/GLOB SERPL: 1.7 (CALC) (ref 1–2.5)
ALP SERPL-CCNC: 43 U/L (ref 36–130)
ALT SERPL-CCNC: 65 U/L (ref 9–46)
APPEARANCE UR: CLEAR
AST SERPL-CCNC: 111 U/L (ref 10–40)
BACTERIA #/AREA URNS HPF: NORMAL /HPF
BACTERIA UR CULT: NORMAL
BASOPHILS # BLD AUTO: 39 CELLS/UL (ref 0–200)
BASOPHILS NFR BLD AUTO: 0.9 %
BILIRUB SERPL-MCNC: 1.3 MG/DL (ref 0.2–1.2)
BILIRUB UR QL STRIP: NEGATIVE
BUN SERPL-MCNC: 11 MG/DL (ref 7–25)
BUN/CREAT SERPL: ABNORMAL (CALC) (ref 6–22)
CALCIUM SERPL-MCNC: 9.1 MG/DL (ref 8.6–10.3)
CHLORIDE SERPL-SCNC: 103 MMOL/L (ref 98–110)
CHOLEST SERPL-MCNC: 202 MG/DL
CHOLEST/HDLC SERPL: 5.3 (CALC)
CO2 SERPL-SCNC: 28 MMOL/L (ref 20–32)
COLOR UR: YELLOW
CREAT SERPL-MCNC: 0.92 MG/DL (ref 0.6–1.35)
EOSINOPHIL # BLD AUTO: 90 CELLS/UL (ref 15–500)
EOSINOPHIL NFR BLD AUTO: 2.1 %
ERYTHROCYTE [DISTWIDTH] IN BLOOD BY AUTOMATED COUNT: 14.3 % (ref 11–15)
GLOBULIN SER CALC-MCNC: 2.7 G/DL (CALC) (ref 1.9–3.7)
GLUCOSE SERPL-MCNC: 75 MG/DL (ref 65–99)
GLUCOSE UR QL STRIP: NEGATIVE
HCT VFR BLD AUTO: 44.2 % (ref 38.5–50)
HDLC SERPL-MCNC: 38 MG/DL
HGB BLD-MCNC: 14.7 G/DL (ref 13.2–17.1)
HGB UR QL STRIP: NEGATIVE
HYALINE CASTS #/AREA URNS LPF: NORMAL /LPF
KETONES UR QL STRIP: NEGATIVE
LDLC SERPL CALC-MCNC: 137 MG/DL (CALC)
LEUKOCYTE ESTERASE UR QL STRIP: NEGATIVE
LYMPHOCYTES # BLD AUTO: 1062 CELLS/UL (ref 850–3900)
LYMPHOCYTES NFR BLD AUTO: 24.7 %
MCH RBC QN AUTO: 27.1 PG (ref 27–33)
MCHC RBC AUTO-ENTMCNC: 33.3 G/DL (ref 32–36)
MCV RBC AUTO: 81.5 FL (ref 80–100)
MONOCYTES # BLD AUTO: 434 CELLS/UL (ref 200–950)
MONOCYTES NFR BLD AUTO: 10.1 %
NEUTROPHILS # BLD AUTO: 2675 CELLS/UL (ref 1500–7800)
NEUTROPHILS NFR BLD AUTO: 62.2 %
NITRITE UR QL STRIP: NEGATIVE
NONHDLC SERPL-MCNC: 164 MG/DL (CALC)
PH UR STRIP: 6 [PH] (ref 5–8)
PLATELET # BLD AUTO: 213 THOUSAND/UL (ref 140–400)
PMV BLD REES-ECKER: 11.9 FL (ref 7.5–12.5)
POTASSIUM SERPL-SCNC: 4.9 MMOL/L (ref 3.5–5.3)
PROT SERPL-MCNC: 7.2 G/DL (ref 6.1–8.1)
PROT UR QL STRIP: NEGATIVE
RBC # BLD AUTO: 5.42 MILLION/UL (ref 4.2–5.8)
RBC #/AREA URNS HPF: NORMAL /HPF
SERVICE CMNT-IMP: NORMAL
SODIUM SERPL-SCNC: 138 MMOL/L (ref 135–146)
SP GR UR STRIP: 1.01 (ref 1–1.03)
SQUAMOUS #/AREA URNS HPF: NORMAL /HPF
TESTOST SERPL-MCNC: 561 NG/DL (ref 250–1100)
TRIGL SERPL-MCNC: 148 MG/DL
TSH SERPL-ACNC: 2.6 MIU/L (ref 0.4–4.5)
WBC # BLD AUTO: 4.3 THOUSAND/UL (ref 3.8–10.8)
WBC #/AREA URNS HPF: NORMAL /HPF

## 2022-02-23 ENCOUNTER — PATIENT MESSAGE (OUTPATIENT)
Dept: FAMILY MEDICINE | Facility: CLINIC | Age: 37
End: 2022-02-23
Payer: COMMERCIAL

## 2022-03-24 ENCOUNTER — PATIENT MESSAGE (OUTPATIENT)
Dept: FAMILY MEDICINE | Facility: CLINIC | Age: 37
End: 2022-03-24
Payer: COMMERCIAL

## 2022-03-24 DIAGNOSIS — R07.89 SENSATION OF CHEST TIGHTNESS: Primary | ICD-10-CM

## 2022-03-31 ENCOUNTER — TELEPHONE (OUTPATIENT)
Dept: CARDIOLOGY | Facility: HOSPITAL | Age: 37
End: 2022-03-31
Payer: COMMERCIAL

## 2022-04-01 ENCOUNTER — CLINICAL SUPPORT (OUTPATIENT)
Dept: CARDIOLOGY | Facility: HOSPITAL | Age: 37
End: 2022-04-01
Attending: PHYSICIAN ASSISTANT
Payer: COMMERCIAL

## 2022-04-01 DIAGNOSIS — R07.89 SENSATION OF CHEST TIGHTNESS: ICD-10-CM

## 2022-04-01 PROCEDURE — 93227 XTRNL ECG REC<48 HR R&I: CPT | Mod: ,,, | Performed by: INTERNAL MEDICINE

## 2022-04-01 PROCEDURE — 93227 HOLTER MONITOR - 48 HOUR (CUPID ONLY): ICD-10-PCS | Mod: ,,, | Performed by: INTERNAL MEDICINE

## 2022-04-01 PROCEDURE — 93226 XTRNL ECG REC<48 HR SCAN A/R: CPT

## 2022-04-06 ENCOUNTER — TELEPHONE (OUTPATIENT)
Dept: FAMILY MEDICINE | Facility: CLINIC | Age: 37
End: 2022-04-06
Payer: COMMERCIAL

## 2022-04-06 DIAGNOSIS — E78.00 ELEVATED CHOLESTEROL: Primary | ICD-10-CM

## 2022-04-06 DIAGNOSIS — R74.8 ELEVATED LIVER ENZYMES: ICD-10-CM

## 2022-04-06 LAB
OHS CV EVENT MONITOR DAY: 2
OHS CV HOLTER LENGTH DECIMAL HOURS: 96
OHS CV HOLTER LENGTH HOURS: 48
OHS CV HOLTER LENGTH MINUTES: 0
OHS CV HOLTER SINUS AVERAGE HR: 87
OHS CV HOLTER SINUS MAX HR: 176
OHS CV HOLTER SINUS MIN HR: 50

## 2022-04-06 NOTE — TELEPHONE ENCOUNTER
Left mess to call me back, no comm consent, so that I can remind him fasting lab is due. Orders sent. Updated remind me

## 2022-04-06 NOTE — TELEPHONE ENCOUNTER
----- Message from Pamela Kang LPN sent at 1/4/2022  2:59 PM CST -----  Your labs all look stable except for liver enzymes and cholesterol.  Liver elevations in the current ratios support an increase in alcohol use.  I would advise to significantly cut back on this.  We can recheck a CMP and lipid panel in 3 months .

## 2022-04-07 ENCOUNTER — PATIENT MESSAGE (OUTPATIENT)
Dept: FAMILY MEDICINE | Facility: CLINIC | Age: 37
End: 2022-04-07

## 2022-04-07 ENCOUNTER — PATIENT MESSAGE (OUTPATIENT)
Dept: FAMILY MEDICINE | Facility: CLINIC | Age: 37
End: 2022-04-07
Payer: COMMERCIAL

## 2022-04-08 LAB
ALBUMIN SERPL-MCNC: 4.8 G/DL (ref 3.6–5.1)
ALBUMIN/GLOB SERPL: 1.8 (CALC) (ref 1–2.5)
ALP SERPL-CCNC: 42 U/L (ref 36–130)
ALT SERPL-CCNC: 38 U/L (ref 9–46)
AST SERPL-CCNC: 24 U/L (ref 10–40)
BILIRUB SERPL-MCNC: 1.5 MG/DL (ref 0.2–1.2)
BUN SERPL-MCNC: 15 MG/DL (ref 7–25)
BUN/CREAT SERPL: ABNORMAL (CALC) (ref 6–22)
CALCIUM SERPL-MCNC: 9.9 MG/DL (ref 8.6–10.3)
CHLORIDE SERPL-SCNC: 101 MMOL/L (ref 98–110)
CHOLEST SERPL-MCNC: 206 MG/DL
CHOLEST/HDLC SERPL: 5.9 (CALC)
CO2 SERPL-SCNC: 23 MMOL/L (ref 20–32)
CREAT SERPL-MCNC: 1.19 MG/DL (ref 0.6–1.35)
GLOBULIN SER CALC-MCNC: 2.6 G/DL (CALC) (ref 1.9–3.7)
GLUCOSE SERPL-MCNC: 87 MG/DL (ref 65–99)
HDLC SERPL-MCNC: 35 MG/DL
LDLC SERPL CALC-MCNC: 147 MG/DL (CALC)
NONHDLC SERPL-MCNC: 171 MG/DL (CALC)
POTASSIUM SERPL-SCNC: 4.6 MMOL/L (ref 3.5–5.3)
PROT SERPL-MCNC: 7.4 G/DL (ref 6.1–8.1)
SODIUM SERPL-SCNC: 137 MMOL/L (ref 135–146)
TRIGL SERPL-MCNC: 119 MG/DL

## 2022-04-15 DIAGNOSIS — F41.9 ANXIETY: ICD-10-CM

## 2022-04-15 DIAGNOSIS — N52.9 ERECTILE DYSFUNCTION, UNSPECIFIED ERECTILE DYSFUNCTION TYPE: ICD-10-CM

## 2022-04-15 RX ORDER — TADALAFIL 20 MG/1
20 TABLET ORAL DAILY
Qty: 12 TABLET | Refills: 2 | Status: CANCELLED | OUTPATIENT
Start: 2022-04-15 | End: 2022-07-14

## 2022-04-15 RX ORDER — ALPRAZOLAM 0.25 MG/1
0.25 TABLET ORAL 2 TIMES DAILY PRN
Qty: 30 TABLET | Refills: 2 | Status: CANCELLED | OUTPATIENT
Start: 2022-04-15 | End: 2022-10-12

## 2022-04-18 ENCOUNTER — PATIENT MESSAGE (OUTPATIENT)
Dept: FAMILY MEDICINE | Facility: CLINIC | Age: 37
End: 2022-04-18

## 2022-04-18 DIAGNOSIS — F41.9 ANXIETY: ICD-10-CM

## 2022-04-18 DIAGNOSIS — N52.9 ERECTILE DYSFUNCTION, UNSPECIFIED ERECTILE DYSFUNCTION TYPE: ICD-10-CM

## 2022-04-18 RX ORDER — TADALAFIL 20 MG/1
20 TABLET ORAL DAILY
Qty: 12 TABLET | Refills: 2 | Status: SHIPPED | OUTPATIENT
Start: 2022-04-18 | End: 2022-05-27 | Stop reason: SDUPTHER

## 2022-04-18 RX ORDER — ALPRAZOLAM 0.25 MG/1
0.25 TABLET ORAL 2 TIMES DAILY PRN
Qty: 30 TABLET | Refills: 2 | Status: SHIPPED | OUTPATIENT
Start: 2022-04-18 | End: 2022-05-27 | Stop reason: SDUPTHER

## 2022-05-25 ENCOUNTER — OFFICE VISIT (OUTPATIENT)
Dept: DERMATOLOGY | Facility: CLINIC | Age: 37
End: 2022-05-25
Payer: COMMERCIAL

## 2022-05-25 VITALS — HEIGHT: 71 IN | WEIGHT: 213 LBS | BODY MASS INDEX: 29.82 KG/M2

## 2022-05-25 DIAGNOSIS — D48.5 NEOPLASM OF UNCERTAIN BEHAVIOR OF SKIN: Primary | ICD-10-CM

## 2022-05-25 DIAGNOSIS — D22.9 MULTIPLE BENIGN NEVI: ICD-10-CM

## 2022-05-25 DIAGNOSIS — L81.4 LENTIGO: ICD-10-CM

## 2022-05-25 DIAGNOSIS — L82.1 SEBORRHEIC KERATOSES: ICD-10-CM

## 2022-05-25 DIAGNOSIS — L73.9 FOLLICULITIS: ICD-10-CM

## 2022-05-25 DIAGNOSIS — A63.0 GENITAL WARTS: ICD-10-CM

## 2022-05-25 DIAGNOSIS — D23.9 DERMATOFIBROMA: ICD-10-CM

## 2022-05-25 PROCEDURE — 11102 PR TANGENTIAL BIOPSY, SKIN, SINGLE LESION: ICD-10-PCS | Mod: 59,S$GLB,, | Performed by: STUDENT IN AN ORGANIZED HEALTH CARE EDUCATION/TRAINING PROGRAM

## 2022-05-25 PROCEDURE — 11102 TANGNTL BX SKIN SINGLE LES: CPT | Mod: 59,S$GLB,, | Performed by: STUDENT IN AN ORGANIZED HEALTH CARE EDUCATION/TRAINING PROGRAM

## 2022-05-25 PROCEDURE — 54056 PR DESTR PENIS LESN,SIMPL,CRYOSURG: ICD-10-PCS | Mod: S$GLB,,, | Performed by: STUDENT IN AN ORGANIZED HEALTH CARE EDUCATION/TRAINING PROGRAM

## 2022-05-25 PROCEDURE — 99213 OFFICE O/P EST LOW 20 MIN: CPT | Mod: 25,S$GLB,, | Performed by: STUDENT IN AN ORGANIZED HEALTH CARE EDUCATION/TRAINING PROGRAM

## 2022-05-25 PROCEDURE — 1160F RVW MEDS BY RX/DR IN RCRD: CPT | Mod: CPTII,S$GLB,, | Performed by: STUDENT IN AN ORGANIZED HEALTH CARE EDUCATION/TRAINING PROGRAM

## 2022-05-25 PROCEDURE — 3008F PR BODY MASS INDEX (BMI) DOCUMENTED: ICD-10-PCS | Mod: CPTII,S$GLB,, | Performed by: STUDENT IN AN ORGANIZED HEALTH CARE EDUCATION/TRAINING PROGRAM

## 2022-05-25 PROCEDURE — 54056 CRYOSURGERY PENIS LESION(S): CPT | Mod: S$GLB,,, | Performed by: STUDENT IN AN ORGANIZED HEALTH CARE EDUCATION/TRAINING PROGRAM

## 2022-05-25 PROCEDURE — 1159F MED LIST DOCD IN RCRD: CPT | Mod: CPTII,S$GLB,, | Performed by: STUDENT IN AN ORGANIZED HEALTH CARE EDUCATION/TRAINING PROGRAM

## 2022-05-25 PROCEDURE — 88342 IMHCHEM/IMCYTCHM 1ST ANTB: CPT | Performed by: PATHOLOGY

## 2022-05-25 PROCEDURE — 88305 TISSUE EXAM BY PATHOLOGIST: CPT | Performed by: PATHOLOGY

## 2022-05-25 PROCEDURE — 1160F PR REVIEW ALL MEDS BY PRESCRIBER/CLIN PHARMACIST DOCUMENTED: ICD-10-PCS | Mod: CPTII,S$GLB,, | Performed by: STUDENT IN AN ORGANIZED HEALTH CARE EDUCATION/TRAINING PROGRAM

## 2022-05-25 PROCEDURE — 99999 PR PBB SHADOW E&M-EST. PATIENT-LVL III: CPT | Mod: PBBFAC,,, | Performed by: STUDENT IN AN ORGANIZED HEALTH CARE EDUCATION/TRAINING PROGRAM

## 2022-05-25 PROCEDURE — 88342 CHG IMMUNOCYTOCHEMISTRY: ICD-10-PCS | Mod: 26,,, | Performed by: PATHOLOGY

## 2022-05-25 PROCEDURE — 99213 PR OFFICE/OUTPT VISIT, EST, LEVL III, 20-29 MIN: ICD-10-PCS | Mod: 25,S$GLB,, | Performed by: STUDENT IN AN ORGANIZED HEALTH CARE EDUCATION/TRAINING PROGRAM

## 2022-05-25 PROCEDURE — 99999 PR PBB SHADOW E&M-EST. PATIENT-LVL III: ICD-10-PCS | Mod: PBBFAC,,, | Performed by: STUDENT IN AN ORGANIZED HEALTH CARE EDUCATION/TRAINING PROGRAM

## 2022-05-25 PROCEDURE — 88305 TISSUE EXAM BY PATHOLOGIST: ICD-10-PCS | Mod: 26,,, | Performed by: PATHOLOGY

## 2022-05-25 PROCEDURE — 88342 IMHCHEM/IMCYTCHM 1ST ANTB: CPT | Mod: 26,,, | Performed by: PATHOLOGY

## 2022-05-25 PROCEDURE — 1159F PR MEDICATION LIST DOCUMENTED IN MEDICAL RECORD: ICD-10-PCS | Mod: CPTII,S$GLB,, | Performed by: STUDENT IN AN ORGANIZED HEALTH CARE EDUCATION/TRAINING PROGRAM

## 2022-05-25 PROCEDURE — 88305 TISSUE EXAM BY PATHOLOGIST: CPT | Mod: 26,,, | Performed by: PATHOLOGY

## 2022-05-25 PROCEDURE — 3008F BODY MASS INDEX DOCD: CPT | Mod: CPTII,S$GLB,, | Performed by: STUDENT IN AN ORGANIZED HEALTH CARE EDUCATION/TRAINING PROGRAM

## 2022-05-25 NOTE — PROGRESS NOTES
Subjective:       Patient ID:  Alejo Zafar is a 36 y.o. male who presents for   Chief Complaint   Patient presents with    Skin Check     TBSE    Warts     LOV: 7/30/21 [place] - genital warts, nevi, folliculitis, atypical nevus, angioma    Skin Check - TBSE  C/o genital warts- he saw Dr. BEAULIEU last year- he used the aldara cream she prescribed but only for 4-5 weeks.       Derm Hx:  Denies phx NMSC/MM  Denies fhx MM  +fhx NMSC        Review of Systems   Skin: Positive for activity-related sunscreen use. Negative for daily sunscreen use and recent sunburn.   Hematologic/Lymphatic: Does not bruise/bleed easily.        Objective:    Physical Exam   Constitutional: He appears well-developed and well-nourished. No distress.   Neurological: He is alert and oriented to person, place, and time. He is not disoriented.   Psychiatric: He has a normal mood and affect.   Skin:   Areas Examined (abnormalities noted in diagram):   Scalp / Hair Palpated and Inspected  Head / Face Inspection Performed  Neck Inspection Performed  Chest / Axilla Inspection Performed  Abdomen Inspection Performed  Genitals / Buttocks / Groin Inspection Performed  Back Inspection Performed  RUE Inspected  LUE Inspection Performed  RLE Inspected  LLE Inspection Performed  Nails and Digits Inspection Performed                       Diagram Legend     Erythematous scaling macule/papule c/w actinic keratosis       Vascular papule c/w angioma      Pigmented verrucoid papule/plaque c/w seborrheic keratosis      Yellow umbilicated papule c/w sebaceous hyperplasia      Irregularly shaped tan macule c/w lentigo     1-2 mm smooth white papules consistent with Milia      Movable subcutaneous cyst with punctum c/w epidermal inclusion cyst      Subcutaneous movable cyst c/w pilar cyst      Firm pink to brown papule c/w dermatofibroma      Pedunculated fleshy papule(s) c/w skin tag(s)      Evenly pigmented macule c/w junctional nevus     Mildly variegated  pigmented, slightly irregular-bordered macule c/w mildly atypical nevus      Flesh colored to evenly pigmented papule c/w intradermal nevus       Pink pearly papule/plaque c/w basal cell carcinoma      Erythematous hyperkeratotic cursted plaque c/w SCC      Surgical scar with no sign of skin cancer recurrence      Open and closed comedones      Inflammatory papules and pustules      Verrucoid papule consistent consistent with wart     Erythematous eczematous patches and plaques     Dystrophic onycholytic nail with subungual debris c/w onychomycosis     Umbilicated papule    Erythematous-base heme-crusted tan verrucoid plaque consistent with inflamed seborrheic keratosis     Erythematous Silvery Scaling Plaque c/w Psoriasis     See annotation          Today              Assessment / Plan:      Pathology Orders:     Normal Orders This Visit    Specimen to Pathology, Dermatology     Questions:    Procedure Type: Dermatology and skin neoplasms    Number of Specimens: 1    ------------------------: -------------------------    Spec 1 Procedure: Biopsy    Spec 1 Clinical Impression: dysplastic nevus, r/o MM    Spec 1 Source: central abdomen    Release to patient:         Neoplasm of uncertain behavior of skin  -     Specimen to Pathology, Dermatology  Shave biopsy procedure note:    Shave biopsy performed after verbal consent including risk of infection, scar, recurrence, need for additional treatment of site. Area prepped with alcohol, anesthetized with approximately 1.0cc of 2% lidocaine with epinephrine. Lesional tissue shaved with razor blade. Hemostasis achieved with application of aluminum chloride followed by hyfrecation. No complications. Dressing applied. Wound care explained.    Genital warts  Cryosurgery procedure note:    Verbal consent from the patient is obtained. Liquid nitrogen cryosurgery is applied to 3  lesions to produce a freeze injury x 2 freeze/thaw cycles. The patient is aware that blisters may  form and is instructed on wound care with gentle cleansing and use of vaseline ointment to keep moist until healed. The patient is supplied a handout on cryosurgery and is instructed to call if lesions do not completely resolve.  Does not want to use aldara  Discussed risks/ benefits of HPV vaccine    Multiple benign nevi  Careful dermoscopy evaluation of nevi performed with one neoplasm identified as needing biopsy today  Monitor for new mole or moles that are becoming bigger, darker, irritated, or developing irregular borders.   Total body skin examination performed today including at least 12 points as noted in physical examination. Suspicious lesions noted.  Patient instructed in importance in daily broad spectrum sun protection of at least spf 30. Mineral sunscreen ingredients preferred (Zinc +/- Titanium) and can be found OTC.   Patient encouraged to wear hat for all outdoor exposure.   Also discussed sun avoidance and use of protective clothing.    Lentigo  This is a benign hyperpigmented sun induced lesion. Daily sun protection will reduce the number of new lesions. Treatment of these benign lesions are considered cosmetic.    Seborrheic keratoses  These are benign inherited growths without a malignant potential. Reassurance given to patient. No treatment is necessary.     Folliculitis  - scalp  - mild today  - prescribed clindamycin solution last year but did not use it, also has BPO was but did not use it  - unsure if he wants to pursue treatment since condition is often chronic/relapsing    Dermatofibroma  This is a benign scar-like lesion secondary to minor trauma. No treatment required.            4-6 weeks to retreat warts  No follow-ups on file.

## 2022-05-25 NOTE — PATIENT INSTRUCTIONS

## 2022-05-27 ENCOUNTER — OFFICE VISIT (OUTPATIENT)
Dept: FAMILY MEDICINE | Facility: CLINIC | Age: 37
End: 2022-05-27
Payer: COMMERCIAL

## 2022-05-27 VITALS
WEIGHT: 215.63 LBS | DIASTOLIC BLOOD PRESSURE: 68 MMHG | HEART RATE: 78 BPM | SYSTOLIC BLOOD PRESSURE: 122 MMHG | OXYGEN SATURATION: 98 % | BODY MASS INDEX: 30.19 KG/M2 | HEIGHT: 71 IN

## 2022-05-27 DIAGNOSIS — N52.9 ERECTILE DYSFUNCTION, UNSPECIFIED ERECTILE DYSFUNCTION TYPE: ICD-10-CM

## 2022-05-27 DIAGNOSIS — Z51.81 ENCOUNTER FOR THERAPEUTIC DRUG MONITORING: ICD-10-CM

## 2022-05-27 DIAGNOSIS — F41.9 ANXIETY: Primary | ICD-10-CM

## 2022-05-27 DIAGNOSIS — F41.9 ANXIETY: ICD-10-CM

## 2022-05-27 DIAGNOSIS — Z79.899 ENCOUNTER FOR LONG-TERM (CURRENT) USE OF OTHER MEDICATIONS: ICD-10-CM

## 2022-05-27 PROCEDURE — 1159F PR MEDICATION LIST DOCUMENTED IN MEDICAL RECORD: ICD-10-PCS | Mod: CPTII,S$GLB,, | Performed by: PHYSICIAN ASSISTANT

## 2022-05-27 PROCEDURE — 3074F SYST BP LT 130 MM HG: CPT | Mod: CPTII,S$GLB,, | Performed by: PHYSICIAN ASSISTANT

## 2022-05-27 PROCEDURE — 99213 OFFICE O/P EST LOW 20 MIN: CPT | Mod: S$GLB,,, | Performed by: PHYSICIAN ASSISTANT

## 2022-05-27 PROCEDURE — 3008F PR BODY MASS INDEX (BMI) DOCUMENTED: ICD-10-PCS | Mod: CPTII,S$GLB,, | Performed by: PHYSICIAN ASSISTANT

## 2022-05-27 PROCEDURE — 99213 PR OFFICE/OUTPT VISIT, EST, LEVL III, 20-29 MIN: ICD-10-PCS | Mod: S$GLB,,, | Performed by: PHYSICIAN ASSISTANT

## 2022-05-27 PROCEDURE — 3074F PR MOST RECENT SYSTOLIC BLOOD PRESSURE < 130 MM HG: ICD-10-PCS | Mod: CPTII,S$GLB,, | Performed by: PHYSICIAN ASSISTANT

## 2022-05-27 PROCEDURE — 3078F PR MOST RECENT DIASTOLIC BLOOD PRESSURE < 80 MM HG: ICD-10-PCS | Mod: CPTII,S$GLB,, | Performed by: PHYSICIAN ASSISTANT

## 2022-05-27 PROCEDURE — 3008F BODY MASS INDEX DOCD: CPT | Mod: CPTII,S$GLB,, | Performed by: PHYSICIAN ASSISTANT

## 2022-05-27 PROCEDURE — 1159F MED LIST DOCD IN RCRD: CPT | Mod: CPTII,S$GLB,, | Performed by: PHYSICIAN ASSISTANT

## 2022-05-27 PROCEDURE — 3078F DIAST BP <80 MM HG: CPT | Mod: CPTII,S$GLB,, | Performed by: PHYSICIAN ASSISTANT

## 2022-05-27 RX ORDER — TADALAFIL 20 MG/1
20 TABLET ORAL DAILY
Qty: 12 TABLET | Refills: 5 | Status: SHIPPED | OUTPATIENT
Start: 2022-05-27 | End: 2023-01-16

## 2022-05-27 RX ORDER — ALPRAZOLAM 0.25 MG/1
0.25 TABLET ORAL 2 TIMES DAILY PRN
Qty: 30 TABLET | Refills: 2 | Status: SHIPPED | OUTPATIENT
Start: 2022-05-27 | End: 2023-01-16

## 2022-05-27 NOTE — PROGRESS NOTES
SUBJECTIVE:    Patient ID: Alejo Zafar is a 36 y.o. male.    Chief Complaint: Medication Refill (No bottles//Pt is here for a check up and medication refills.//No complaints today.//CHRISTY)    This is a 36-year-old male who presents today for regular checkup and medication refills.  Reports doing well overall with no new concerns or complaints at this time.  He he did a great job cutting back on alcohol use and his liver enzymes have returned to normal.  He is being treated per Dermatology for genital warts and recently had dysplastic nevus removed from his abdomen to rule out malignant melanoma.  Today he reports doing better. On ketogenic diet. Had case of COVID in early May. Sxs persist for about a week. Cough finally resolved. He is planning to get the genital warts removed in the next few months. Was difficult for him treating constantly.      Telephone on 04/06/2022   Component Date Value Ref Range Status    Glucose 04/07/2022 87  65 - 99 mg/dL Final    BUN 04/07/2022 15  7 - 25 mg/dL Final    Creatinine 04/07/2022 1.19  0.60 - 1.35 mg/dL Final    eGFR if non African American 04/07/2022 78  > OR = 60 mL/min/1.73m2 Final    eGFR if African American 04/07/2022 90  > OR = 60 mL/min/1.73m2 Final    BUN/Creatinine Ratio 04/07/2022 NOT APPLICABLE  6 - 22 (calc) Final    Sodium 04/07/2022 137  135 - 146 mmol/L Final    Potassium 04/07/2022 4.6  3.5 - 5.3 mmol/L Final    Chloride 04/07/2022 101  98 - 110 mmol/L Final    CO2 04/07/2022 23  20 - 32 mmol/L Final    Calcium 04/07/2022 9.9  8.6 - 10.3 mg/dL Final    Total Protein 04/07/2022 7.4  6.1 - 8.1 g/dL Final    Albumin 04/07/2022 4.8  3.6 - 5.1 g/dL Final    Globulin, Total 04/07/2022 2.6  1.9 - 3.7 g/dL (calc) Final    Albumin/Globulin Ratio 04/07/2022 1.8  1.0 - 2.5 (calc) Final    Total Bilirubin 04/07/2022 1.5 (A) 0.2 - 1.2 mg/dL Final    Alkaline Phosphatase 04/07/2022 42  36 - 130 U/L Final    AST 04/07/2022 24  10 - 40 U/L Final    ALT  04/07/2022 38  9 - 46 U/L Final    Cholesterol 04/07/2022 206 (A) <200 mg/dL Final    HDL 04/07/2022 35 (A) > OR = 40 mg/dL Final    Triglycerides 04/07/2022 119  <150 mg/dL Final    LDL Cholesterol 04/07/2022 147 (A) mg/dL (calc) Final    HDL/Cholesterol Ratio 04/07/2022 5.9 (A) <5.0 (calc) Final    Non HDL Chol. (LDL+VLDL) 04/07/2022 171 (A) <130 mg/dL (calc) Final   Clinical Support on 04/01/2022   Component Date Value Ref Range Status    Holter length hours 04/01/2022 48   Final    holter length minutes 04/01/2022 0   Final    holter length dec hours 04/01/2022 96.00   Final    Sinus min HR 04/01/2022 50   Final    Sinus max hr 04/01/2022 176   Final    Sinus avg hr 04/01/2022 87   Final    Event Monitor Day 04/01/2022 2   Final   Office Visit on 12/23/2021   Component Date Value Ref Range Status    WBC 01/03/2022 4.3  3.8 - 10.8 Thousand/uL Final    RBC 01/03/2022 5.42  4.20 - 5.80 Million/uL Final    Hemoglobin 01/03/2022 14.7  13.2 - 17.1 g/dL Final    Hematocrit 01/03/2022 44.2  38.5 - 50.0 % Final    MCV 01/03/2022 81.5  80.0 - 100.0 fL Final    MCH 01/03/2022 27.1  27.0 - 33.0 pg Final    MCHC 01/03/2022 33.3  32.0 - 36.0 g/dL Final    RDW 01/03/2022 14.3  11.0 - 15.0 % Final    Platelets 01/03/2022 213  140 - 400 Thousand/uL Final    MPV 01/03/2022 11.9  7.5 - 12.5 fL Final    Neutrophils, Abs 01/03/2022 2,675  1,500 - 7,800 cells/uL Final    Lymph # 01/03/2022 1,062  850 - 3,900 cells/uL Final    Mono # 01/03/2022 434  200 - 950 cells/uL Final    Eos # 01/03/2022 90  15 - 500 cells/uL Final    Baso # 01/03/2022 39  0 - 200 cells/uL Final    Neutrophils Relative 01/03/2022 62.2  % Final    Lymph % 01/03/2022 24.7  % Final    Mono % 01/03/2022 10.1  % Final    Eosinophil % 01/03/2022 2.1  % Final    Basophil % 01/03/2022 0.9  % Final    Differential Comment 01/03/2022 Review of peripheral smear confirms automated results.   Final    Glucose 01/03/2022 75  65 - 99 mg/dL  Final    BUN 01/03/2022 11  7 - 25 mg/dL Final    Creatinine 01/03/2022 0.92  0.60 - 1.35 mg/dL Final    eGFR if non African American 01/03/2022 106  > OR = 60 mL/min/1.73m2 Final    eGFR if  01/03/2022 123  > OR = 60 mL/min/1.73m2 Final    BUN/Creatinine Ratio 01/03/2022 NOT APPLICABLE  6 - 22 (calc) Final    Sodium 01/03/2022 138  135 - 146 mmol/L Final    Potassium 01/03/2022 4.9  3.5 - 5.3 mmol/L Final    Chloride 01/03/2022 103  98 - 110 mmol/L Final    CO2 01/03/2022 28  20 - 32 mmol/L Final    Calcium 01/03/2022 9.1  8.6 - 10.3 mg/dL Final    Total Protein 01/03/2022 7.2  6.1 - 8.1 g/dL Final    Albumin 01/03/2022 4.5  3.6 - 5.1 g/dL Final    Globulin, Total 01/03/2022 2.7  1.9 - 3.7 g/dL (calc) Final    Albumin/Globulin Ratio 01/03/2022 1.7  1.0 - 2.5 (calc) Final    Total Bilirubin 01/03/2022 1.3 (A) 0.2 - 1.2 mg/dL Final    Alkaline Phosphatase 01/03/2022 43  36 - 130 U/L Final    AST 01/03/2022 111 (A) 10 - 40 U/L Final    ALT 01/03/2022 65 (A) 9 - 46 U/L Final    Cholesterol 01/03/2022 202 (A) <200 mg/dL Final    HDL 01/03/2022 38 (A) > OR = 40 mg/dL Final    Triglycerides 01/03/2022 148  <150 mg/dL Final    LDL Cholesterol 01/03/2022 137 (A) mg/dL (calc) Final    HDL/Cholesterol Ratio 01/03/2022 5.3 (A) <5.0 (calc) Final    Non HDL Chol. (LDL+VLDL) 01/03/2022 164 (A) <130 mg/dL (calc) Final    TSH w/reflex to FT4 01/03/2022 2.60  0.40 - 4.50 mIU/L Final    Color, UA 01/03/2022 YELLOW  YELLOW Final    Appearance, UA 01/03/2022 CLEAR  CLEAR Final    Specific Gravity, UA 01/03/2022 1.008  1.001 - 1.035 Final    pH, UA 01/03/2022 6.0  5.0 - 8.0 Final    Glucose, UA 01/03/2022 NEGATIVE  NEGATIVE Final    Bilirubin, UA 01/03/2022 NEGATIVE  NEGATIVE Final    Ketones, UA 01/03/2022 NEGATIVE  NEGATIVE Final    Occult Blood UA 01/03/2022 NEGATIVE  NEGATIVE Final    Protein, UA 01/03/2022 NEGATIVE  NEGATIVE Final    Nitrite, UA 01/03/2022 NEGATIVE  NEGATIVE Final     Leukocytes, UA 01/03/2022 NEGATIVE  NEGATIVE Final    WBC Casts, UA 01/03/2022 NONE SEEN  < OR = 5 /HPF Final    RBC Casts, UA 01/03/2022 NONE SEEN  < OR = 2 /HPF Final    Squam Epithel, UA 01/03/2022 NONE SEEN  < OR = 5 /HPF Final    Bacteria, UA 01/03/2022 NONE SEEN  NONE SEEN /HPF Final    Hyaline Casts, UA 01/03/2022 NONE SEEN  NONE SEEN /LPF Final    Reflexive Urine Culture 01/03/2022    Final    Testosterone 01/03/2022 561  250 - 1,100 ng/dL Final       Past Medical History:   Diagnosis Date    Hyperlipidemia     Hypertension      Past Surgical History:   Procedure Laterality Date    WISDOM TOOTH EXTRACTION       Family History   Problem Relation Age of Onset    Stroke Mother 55    Hypertension Mother     Heart disease Maternal Grandfather     No Known Problems Father     Breast cancer Sister 38    Breast cancer Paternal Grandmother        Marital Status:   Alcohol History:  reports current alcohol use.  Tobacco History:  reports that he quit smoking about 11 years ago. His smoking use included cigarettes. He quit after 15.00 years of use. He has never used smokeless tobacco.  Drug History:  reports current drug use. Drug: Marijuana.    Review of patient's allergies indicates:  No Known Allergies    Current Outpatient Medications:     ALPRAZolam (XANAX) 0.25 MG tablet, Take 1 tablet (0.25 mg total) by mouth 2 (two) times daily as needed for Anxiety., Disp: 30 tablet, Rfl: 2    omega-3 fatty acids-fish oil 340-1,000 mg Cap, Take 1 capsule by mouth., Disp: , Rfl:     tadalafiL (CIALIS) 20 MG Tab, Take 1 tablet (20 mg total) by mouth once daily., Disp: 12 tablet, Rfl: 5    Review of Systems   Constitutional: Negative for activity change, fatigue, fever and unexpected weight change.   HENT: Negative for congestion.    Respiratory: Negative for apnea, cough, chest tightness and shortness of breath.    Cardiovascular: Negative for chest pain and palpitations.   Gastrointestinal:  "Negative for abdominal distention and abdominal pain.   Genitourinary: Negative for difficulty urinating and dysuria.   Musculoskeletal: Negative for arthralgias and back pain.   Neurological: Negative for dizziness and weakness.          Objective:      Vitals:    05/27/22 0921   BP: 122/68   Pulse: 78   SpO2: 98%   Weight: 97.8 kg (215 lb 9.6 oz)   Height: 5' 10.5" (1.791 m)     Physical Exam  Constitutional:       General: He is not in acute distress.     Appearance: He is well-developed.   HENT:      Head: Normocephalic and atraumatic.   Eyes:      Pupils: Pupils are equal, round, and reactive to light.   Neck:      Thyroid: No thyromegaly.   Cardiovascular:      Rate and Rhythm: Normal rate and regular rhythm.      Heart sounds: Normal heart sounds.   Pulmonary:      Effort: Pulmonary effort is normal.      Breath sounds: Normal breath sounds.   Abdominal:      General: Bowel sounds are normal. There is no distension.      Palpations: Abdomen is soft.      Tenderness: There is no abdominal tenderness.   Musculoskeletal:         General: Normal range of motion.      Cervical back: Normal range of motion and neck supple.   Skin:     General: Skin is warm and dry.      Findings: No erythema or rash.   Neurological:      Mental Status: He is alert and oriented to person, place, and time.      Cranial Nerves: No cranial nerve deficit.           Assessment:       1. Anxiety    2. Encounter for therapeutic drug monitoring    3. Encounter for long-term (current) use of other medications    4. Erectile dysfunction, unspecified erectile dysfunction type    5. Anxiety         Plan:       Anxiety  -     DRUG MONITOR, PANEL 4, W/CONF, URINE  -     ALPRAZolam (XANAX) 0.25 MG tablet; Take 1 tablet (0.25 mg total) by mouth 2 (two) times daily as needed for Anxiety.  Dispense: 30 tablet; Refill: 2    Encounter for therapeutic drug monitoring  -     DRUG MONITOR, PANEL 4, W/CONF, URINE    Encounter for long-term (current) use of " other medications  -     DRUG MONITOR, PANEL 4, W/CONF, URINE    Erectile dysfunction, unspecified erectile dysfunction type  Comments:  effective. possibly psychogenic. continue as is.  Orders:  -     tadalafiL (CIALIS) 20 MG Tab; Take 1 tablet (20 mg total) by mouth once daily.  Dispense: 12 tablet; Refill: 5    Anxiety  Comments:  stable, uses the alprazolam as needed.  Orders:  -     DRUG MONITOR, PANEL 4, W/CONF, URINE  -     ALPRAZolam (XANAX) 0.25 MG tablet; Take 1 tablet (0.25 mg total) by mouth 2 (two) times daily as needed for Anxiety.  Dispense: 30 tablet; Refill: 2      Follow up in about 6 months (around 11/27/2022) for Annual Physical.        5/27/2022 Freddy Arana PA-C

## 2022-06-03 LAB
FINAL PATHOLOGIC DIAGNOSIS: NORMAL
GROSS: NORMAL
Lab: NORMAL
MICROSCOPIC EXAM: NORMAL

## 2022-06-06 ENCOUNTER — PATIENT MESSAGE (OUTPATIENT)
Dept: FAMILY MEDICINE | Facility: CLINIC | Age: 37
End: 2022-06-06

## 2022-06-06 DIAGNOSIS — R06.83 SNORING: Primary | ICD-10-CM

## 2022-06-14 ENCOUNTER — PATIENT MESSAGE (OUTPATIENT)
Dept: FAMILY MEDICINE | Facility: CLINIC | Age: 37
End: 2022-06-14

## 2022-06-14 DIAGNOSIS — R06.83 SNORING: Primary | ICD-10-CM

## 2022-07-06 ENCOUNTER — PATIENT MESSAGE (OUTPATIENT)
Dept: PULMONOLOGY | Facility: CLINIC | Age: 37
End: 2022-07-06

## 2022-07-06 ENCOUNTER — OFFICE VISIT (OUTPATIENT)
Dept: PULMONOLOGY | Facility: CLINIC | Age: 37
End: 2022-07-06
Payer: COMMERCIAL

## 2022-07-06 VITALS
WEIGHT: 214 LBS | BODY MASS INDEX: 29.96 KG/M2 | OXYGEN SATURATION: 97 % | HEIGHT: 71 IN | DIASTOLIC BLOOD PRESSURE: 80 MMHG | HEART RATE: 90 BPM | SYSTOLIC BLOOD PRESSURE: 120 MMHG

## 2022-07-06 DIAGNOSIS — R06.83 SNORING: Primary | ICD-10-CM

## 2022-07-06 DIAGNOSIS — R53.82 CHRONIC FATIGUE: ICD-10-CM

## 2022-07-06 DIAGNOSIS — R35.1 FREQUENT NOCTUNAL URINATION: ICD-10-CM

## 2022-07-06 PROCEDURE — 99214 OFFICE O/P EST MOD 30 MIN: CPT | Mod: S$GLB,,, | Performed by: NURSE PRACTITIONER

## 2022-07-06 PROCEDURE — 99214 PR OFFICE/OUTPT VISIT, EST, LEVL IV, 30-39 MIN: ICD-10-PCS | Mod: S$GLB,,, | Performed by: NURSE PRACTITIONER

## 2022-07-06 PROCEDURE — 1159F MED LIST DOCD IN RCRD: CPT | Mod: CPTII,S$GLB,, | Performed by: NURSE PRACTITIONER

## 2022-07-06 PROCEDURE — 1159F PR MEDICATION LIST DOCUMENTED IN MEDICAL RECORD: ICD-10-PCS | Mod: CPTII,S$GLB,, | Performed by: NURSE PRACTITIONER

## 2022-07-06 NOTE — PROGRESS NOTES
SUBJECTIVE:    Patient ID: Alejo Zafar is a 36 y.o. male.    Chief Complaint: Establish Care    HPI   Patient here today to be evaluated for WILLARD by his PCP.  He complains of excessive night time urination, snoring, and waking up not feeling rested. He is chronically fatigued. He occasionally feels palpitations. He does have a bedtime regime that he does every night and states he is in bed nightly for 9-10pm and wakes up 5:30-6am.    Past Medical History:   Diagnosis Date    Hyperlipidemia     Hypertension      Past Surgical History:   Procedure Laterality Date    WISDOM TOOTH EXTRACTION       Family History   Problem Relation Age of Onset    Stroke Mother 55    Hypertension Mother     Heart disease Maternal Grandfather     No Known Problems Father     Breast cancer Sister 38    Breast cancer Paternal Grandmother         Social History:   Marital Status:   Occupation: IT   Alcohol History:  reports current alcohol use.  Tobacco History:  reports that he quit smoking about 11 years ago. His smoking use included cigarettes. He quit after 15.00 years of use. He has never used smokeless tobacco.  Drug History:  reports current drug use. Drug: Marijuana.    Review of patient's allergies indicates:  No Known Allergies    Current Outpatient Medications   Medication Sig Dispense Refill    ALPRAZolam (XANAX) 0.25 MG tablet Take 1 tablet (0.25 mg total) by mouth 2 (two) times daily as needed for Anxiety. 30 tablet 2    omega-3 fatty acids-fish oil 340-1,000 mg Cap Take 1 capsule by mouth.      tadalafiL (CIALIS) 20 MG Tab Take 1 tablet (20 mg total) by mouth once daily. 12 tablet 5     No current facility-administered medications for this visit.           Review of Systems  General: Feeling Well. Wakes up not feeling rested, snores   Eyes: Vision is good.  ENT:  No sinusitis or pharyngitis.   Heart:: palpitations every once in a while   Lungs: No cough, sputum, or wheezing.  GI: No Nausea, vomiting,  "constipation, diarrhea, or reflux.  : frequent night time urination   Musculoskeletal: No joint pain or myalgias.  Skin: No lesions or rashes.  Neuro: No headaches, some brain fog   Lymph: No edema or adenopathy.  Psych: anxiety  Endo: No weight change.    OBJECTIVE:      /80 (BP Location: Left arm, Patient Position: Sitting, BP Method: Medium (Manual))   Pulse 90   Ht 5' 10.5" (1.791 m)   Wt 97.1 kg (214 lb)   SpO2 97%   BMI 30.27 kg/m²     Physical Exam  GENERAL: middle aged patient in no distress.  HEENT: Pupils equal and reactive. Extraocular movements intact. Nose intact.      Pharynx moist. malampatti 3   NECK: Supple.  16 inches   HEART: Regular rate and rhythm. No murmur or gallop auscultated.  LUNGS: Clear to auscultation and percussion. Lung excursion symmetrical. No change in fremitus. No adventitial noises.  ABDOMEN: Bowel sounds present. Non-tender, no masses palpated.  EXTREMITIES: Normal muscle tone and joint movement, no cyanosis or clubbing.   LYMPHATICS: No adenopathy palpated, no edema.  SKIN: Dry, intact, no lesions.   NEURO: Cranial nerves II-XII intact. Motor strength 5/5 bilaterally, upper and lower extremities.  PSYCH: Appropriate affect.    Assessment:       1. Snoring    2. Chronic fatigue    3. Frequent noctunal urination        epworth score is 1  Plan:        home sleep study     Follow up in about 3 months (around 10/6/2022).          "

## 2022-07-07 ENCOUNTER — OFFICE VISIT (OUTPATIENT)
Dept: DERMATOLOGY | Facility: CLINIC | Age: 37
End: 2022-07-07
Payer: COMMERCIAL

## 2022-07-07 DIAGNOSIS — A63.0 GENITAL WARTS: Primary | ICD-10-CM

## 2022-07-07 PROCEDURE — 99999 PR PBB SHADOW E&M-EST. PATIENT-LVL II: ICD-10-PCS | Mod: PBBFAC,,, | Performed by: STUDENT IN AN ORGANIZED HEALTH CARE EDUCATION/TRAINING PROGRAM

## 2022-07-07 PROCEDURE — 99499 NO LOS: ICD-10-PCS | Mod: S$GLB,,, | Performed by: STUDENT IN AN ORGANIZED HEALTH CARE EDUCATION/TRAINING PROGRAM

## 2022-07-07 PROCEDURE — 1159F MED LIST DOCD IN RCRD: CPT | Mod: CPTII,S$GLB,, | Performed by: STUDENT IN AN ORGANIZED HEALTH CARE EDUCATION/TRAINING PROGRAM

## 2022-07-07 PROCEDURE — 54056 PR DESTR PENIS LESN,SIMPL,CRYOSURG: ICD-10-PCS | Mod: S$GLB,,, | Performed by: STUDENT IN AN ORGANIZED HEALTH CARE EDUCATION/TRAINING PROGRAM

## 2022-07-07 PROCEDURE — 1160F PR REVIEW ALL MEDS BY PRESCRIBER/CLIN PHARMACIST DOCUMENTED: ICD-10-PCS | Mod: CPTII,S$GLB,, | Performed by: STUDENT IN AN ORGANIZED HEALTH CARE EDUCATION/TRAINING PROGRAM

## 2022-07-07 PROCEDURE — 54056 CRYOSURGERY PENIS LESION(S): CPT | Mod: S$GLB,,, | Performed by: STUDENT IN AN ORGANIZED HEALTH CARE EDUCATION/TRAINING PROGRAM

## 2022-07-07 PROCEDURE — 1159F PR MEDICATION LIST DOCUMENTED IN MEDICAL RECORD: ICD-10-PCS | Mod: CPTII,S$GLB,, | Performed by: STUDENT IN AN ORGANIZED HEALTH CARE EDUCATION/TRAINING PROGRAM

## 2022-07-07 PROCEDURE — 99499 UNLISTED E&M SERVICE: CPT | Mod: S$GLB,,, | Performed by: STUDENT IN AN ORGANIZED HEALTH CARE EDUCATION/TRAINING PROGRAM

## 2022-07-07 PROCEDURE — 1160F RVW MEDS BY RX/DR IN RCRD: CPT | Mod: CPTII,S$GLB,, | Performed by: STUDENT IN AN ORGANIZED HEALTH CARE EDUCATION/TRAINING PROGRAM

## 2022-07-07 PROCEDURE — 99999 PR PBB SHADOW E&M-EST. PATIENT-LVL II: CPT | Mod: PBBFAC,,, | Performed by: STUDENT IN AN ORGANIZED HEALTH CARE EDUCATION/TRAINING PROGRAM

## 2022-07-07 NOTE — PROGRESS NOTES
Subjective:       Patient ID:  Alejo Zafar is a 36 y.o. male who presents for   Chief Complaint   Patient presents with    Follow-up     warts     LOV 5/25/22    Patient here today for f/u on genital warts  Patient states they have mostly resolved. One still present.  Treated with cryo at last visit.      Derm Hx:  Denies phx NMSC/MM  Denies fhx MM  +fhx NMSC      Review of Systems   Constitutional: Negative for fever, chills and fatigue.   Respiratory: Negative for cough and shortness of breath.    Gastrointestinal: Negative for nausea and vomiting.        Objective:    Physical Exam   Constitutional: He appears well-developed and well-nourished.   Neurological: He is alert and oriented to person, place, and time.   Psychiatric: He has a normal mood and affect.   Skin:   Areas Examined (abnormalities noted in diagram):   Genitals / Buttocks / Groin Inspection Performed              Diagram Legend     Erythematous scaling macule/papule c/w actinic keratosis       Vascular papule c/w angioma      Pigmented verrucoid papule/plaque c/w seborrheic keratosis      Yellow umbilicated papule c/w sebaceous hyperplasia      Irregularly shaped tan macule c/w lentigo     1-2 mm smooth white papules consistent with Milia      Movable subcutaneous cyst with punctum c/w epidermal inclusion cyst      Subcutaneous movable cyst c/w pilar cyst      Firm pink to brown papule c/w dermatofibroma      Pedunculated fleshy papule(s) c/w skin tag(s)      Evenly pigmented macule c/w junctional nevus     Mildly variegated pigmented, slightly irregular-bordered macule c/w mildly atypical nevus      Flesh colored to evenly pigmented papule c/w intradermal nevus       Pink pearly papule/plaque c/w basal cell carcinoma      Erythematous hyperkeratotic cursted plaque c/w SCC      Surgical scar with no sign of skin cancer recurrence      Open and closed comedones      Inflammatory papules and pustules      Verrucoid papule consistent  consistent with wart     Erythematous eczematous patches and plaques     Dystrophic onycholytic nail with subungual debris c/w onychomycosis     Umbilicated papule    Erythematous-base heme-crusted tan verrucoid plaque consistent with inflamed seborrheic keratosis     Erythematous Silvery Scaling Plaque c/w Psoriasis     See annotation      Assessment / Plan:        Genital warts  Cryosurgery procedure note:    Verbal consent from the patient is obtained. Liquid nitrogen cryosurgery is applied to 1 verruca, as detailed in the physical exam, to produce a freeze injury. 2 consecutive freeze thaw cycles are applied to each verruca. The patient is aware that blisters (possibly blood blisters) may form.    Also with mildly atypical nevus- will have 1 year skin check           No follow-ups on file.

## 2022-07-07 NOTE — PATIENT INSTRUCTIONS

## 2022-08-18 ENCOUNTER — OFFICE VISIT (OUTPATIENT)
Dept: DERMATOLOGY | Facility: CLINIC | Age: 37
End: 2022-08-18
Payer: COMMERCIAL

## 2022-08-18 DIAGNOSIS — L90.5 SCAR: ICD-10-CM

## 2022-08-18 DIAGNOSIS — A63.0 GENITAL WARTS: Primary | ICD-10-CM

## 2022-08-18 DIAGNOSIS — Z86.018 HISTORY OF DYSPLASTIC NEVUS: ICD-10-CM

## 2022-08-18 DIAGNOSIS — L81.4 LENTIGO SIMPLEX: ICD-10-CM

## 2022-08-18 PROCEDURE — 99212 PR OFFICE/OUTPT VISIT, EST, LEVL II, 10-19 MIN: ICD-10-PCS | Mod: S$GLB,,, | Performed by: STUDENT IN AN ORGANIZED HEALTH CARE EDUCATION/TRAINING PROGRAM

## 2022-08-18 PROCEDURE — 1160F RVW MEDS BY RX/DR IN RCRD: CPT | Mod: CPTII,S$GLB,, | Performed by: STUDENT IN AN ORGANIZED HEALTH CARE EDUCATION/TRAINING PROGRAM

## 2022-08-18 PROCEDURE — 99999 PR PBB SHADOW E&M-EST. PATIENT-LVL II: CPT | Mod: PBBFAC,,, | Performed by: STUDENT IN AN ORGANIZED HEALTH CARE EDUCATION/TRAINING PROGRAM

## 2022-08-18 PROCEDURE — 1159F PR MEDICATION LIST DOCUMENTED IN MEDICAL RECORD: ICD-10-PCS | Mod: CPTII,S$GLB,, | Performed by: STUDENT IN AN ORGANIZED HEALTH CARE EDUCATION/TRAINING PROGRAM

## 2022-08-18 PROCEDURE — 1160F PR REVIEW ALL MEDS BY PRESCRIBER/CLIN PHARMACIST DOCUMENTED: ICD-10-PCS | Mod: CPTII,S$GLB,, | Performed by: STUDENT IN AN ORGANIZED HEALTH CARE EDUCATION/TRAINING PROGRAM

## 2022-08-18 PROCEDURE — 99212 OFFICE O/P EST SF 10 MIN: CPT | Mod: S$GLB,,, | Performed by: STUDENT IN AN ORGANIZED HEALTH CARE EDUCATION/TRAINING PROGRAM

## 2022-08-18 PROCEDURE — 1159F MED LIST DOCD IN RCRD: CPT | Mod: CPTII,S$GLB,, | Performed by: STUDENT IN AN ORGANIZED HEALTH CARE EDUCATION/TRAINING PROGRAM

## 2022-08-18 PROCEDURE — 99999 PR PBB SHADOW E&M-EST. PATIENT-LVL II: ICD-10-PCS | Mod: PBBFAC,,, | Performed by: STUDENT IN AN ORGANIZED HEALTH CARE EDUCATION/TRAINING PROGRAM

## 2022-08-18 NOTE — PROGRESS NOTES
Subjective:       Patient ID:  Alejo Zafar is a 36 y.o. male who presents for   Chief Complaint   Patient presents with    Genital Warts     F/U     LOV: 7/7/22    Patient here today for f/u on genital warts  Patient states no new spot.  Treated with cryo at last visit.        Derm Hx:  Denies phx NMSC/MM  Denies fhx MM  +fhx NMSC         Review of Systems   Constitutional: Negative for fever, chills and fatigue.   Respiratory: Negative for cough and shortness of breath.    Gastrointestinal: Negative for nausea and vomiting.        Objective:    Physical Exam   Constitutional: He appears well-developed and well-nourished.   Genitourinary:         Neurological: He is alert and oriented to person, place, and time.   Psychiatric: He has a normal mood and affect.   Skin:   Areas Examined (abnormalities noted in diagram):   Genitals / Buttocks / Groin Inspection Performed         Diagram Legend     Erythematous scaling macule/papule c/w actinic keratosis       Vascular papule c/w angioma      Pigmented verrucoid papule/plaque c/w seborrheic keratosis      Yellow umbilicated papule c/w sebaceous hyperplasia      Irregularly shaped tan macule c/w lentigo     1-2 mm smooth white papules consistent with Milia      Movable subcutaneous cyst with punctum c/w epidermal inclusion cyst      Subcutaneous movable cyst c/w pilar cyst      Firm pink to brown papule c/w dermatofibroma      Pedunculated fleshy papule(s) c/w skin tag(s)      Evenly pigmented macule c/w junctional nevus     Mildly variegated pigmented, slightly irregular-bordered macule c/w mildly atypical nevus      Flesh colored to evenly pigmented papule c/w intradermal nevus       Pink pearly papule/plaque c/w basal cell carcinoma      Erythematous hyperkeratotic cursted plaque c/w SCC      Surgical scar with no sign of skin cancer recurrence      Open and closed comedones      Inflammatory papules and pustules      Verrucoid papule consistent consistent with  wart     Erythematous eczematous patches and plaques     Dystrophic onycholytic nail with subungual debris c/w onychomycosis     Umbilicated papule    Erythematous-base heme-crusted tan verrucoid plaque consistent with inflamed seborrheic keratosis     Erythematous Silvery Scaling Plaque c/w Psoriasis     See annotation      Assessment / Plan:        Genital warts  - no residual lesions present today    Lentigo simplex  - distal shaft of penis, no concerning features    History of dysplastic nevus  Scar  - mildly atypical nevus on mid upper abdomen, no visual pigment today, will monitor, recommend 1 year f/u           No follow-ups on file.

## 2022-08-31 ENCOUNTER — TELEPHONE (OUTPATIENT)
Dept: PULMONOLOGY | Facility: CLINIC | Age: 37
End: 2022-08-31

## 2022-08-31 DIAGNOSIS — R53.82 CHRONIC FATIGUE: ICD-10-CM

## 2022-08-31 DIAGNOSIS — R06.83 SNORING: Primary | ICD-10-CM

## 2022-08-31 NOTE — TELEPHONE ENCOUNTER
----- Message from Bill Hutchinson MA sent at 8/31/2022  9:31 AM CDT -----  Regarding: HST order  Pt was to have a HST ordered per pts visit.  Im not seeing the order and pt has not heard from anyone about scheduling it.  Can you please check

## 2022-09-01 ENCOUNTER — PATIENT MESSAGE (OUTPATIENT)
Dept: PULMONOLOGY | Facility: CLINIC | Age: 37
End: 2022-09-01

## 2022-09-01 ENCOUNTER — PROCEDURE VISIT (OUTPATIENT)
Dept: SLEEP MEDICINE | Facility: HOSPITAL | Age: 37
End: 2022-09-01
Attending: NURSE PRACTITIONER
Payer: COMMERCIAL

## 2022-09-01 DIAGNOSIS — R53.82 CHRONIC FATIGUE: ICD-10-CM

## 2022-09-01 DIAGNOSIS — R06.83 SNORING: ICD-10-CM

## 2022-09-01 PROCEDURE — 95806 SLEEP STUDY UNATT&RESP EFFT: CPT

## 2022-09-30 ENCOUNTER — PATIENT MESSAGE (OUTPATIENT)
Dept: PULMONOLOGY | Facility: CLINIC | Age: 37
End: 2022-09-30

## 2022-10-04 ENCOUNTER — PATIENT MESSAGE (OUTPATIENT)
Dept: DERMATOLOGY | Facility: CLINIC | Age: 37
End: 2022-10-04
Payer: COMMERCIAL

## 2022-10-12 ENCOUNTER — PATIENT MESSAGE (OUTPATIENT)
Dept: FAMILY MEDICINE | Facility: CLINIC | Age: 37
End: 2022-10-12

## 2022-10-12 DIAGNOSIS — N52.9 ERECTILE DYSFUNCTION, UNSPECIFIED ERECTILE DYSFUNCTION TYPE: Primary | ICD-10-CM

## 2022-10-12 DIAGNOSIS — Z31.41 FERTILITY TESTING: ICD-10-CM

## 2022-10-21 ENCOUNTER — PATIENT MESSAGE (OUTPATIENT)
Dept: FAMILY MEDICINE | Facility: CLINIC | Age: 37
End: 2022-10-21

## 2022-10-25 ENCOUNTER — LAB VISIT (OUTPATIENT)
Dept: LAB | Facility: HOSPITAL | Age: 37
End: 2022-10-25
Attending: UROLOGY
Payer: COMMERCIAL

## 2022-10-25 ENCOUNTER — OFFICE VISIT (OUTPATIENT)
Dept: UROLOGY | Facility: CLINIC | Age: 37
End: 2022-10-25
Payer: COMMERCIAL

## 2022-10-25 VITALS
WEIGHT: 218.94 LBS | HEART RATE: 75 BPM | HEIGHT: 70 IN | SYSTOLIC BLOOD PRESSURE: 129 MMHG | DIASTOLIC BLOOD PRESSURE: 81 MMHG | BODY MASS INDEX: 31.34 KG/M2

## 2022-10-25 DIAGNOSIS — Z31.41 FERTILITY TESTING: ICD-10-CM

## 2022-10-25 DIAGNOSIS — R35.1 NOCTURIA: ICD-10-CM

## 2022-10-25 LAB
BILIRUBIN, UA POC OHS: NEGATIVE
BLOOD, UA POC OHS: NEGATIVE
CLARITY, UA POC OHS: CLEAR
COLOR, UA POC OHS: YELLOW
GLUCOSE, UA POC OHS: NEGATIVE
KETONES, UA POC OHS: NEGATIVE
LEUKOCYTES, UA POC OHS: NEGATIVE
NITRITE, UA POC OHS: NEGATIVE
PH, UA POC OHS: 7
PROTEIN, UA POC OHS: NEGATIVE
SPECIFIC GRAVITY, UA POC OHS: 1.01
UROBILINOGEN, UA POC OHS: 0.2

## 2022-10-25 PROCEDURE — 99999 PR PBB SHADOW E&M-EST. PATIENT-LVL IV: CPT | Mod: PBBFAC,,, | Performed by: UROLOGY

## 2022-10-25 PROCEDURE — 99204 PR OFFICE/OUTPT VISIT, NEW, LEVL IV, 45-59 MIN: ICD-10-PCS | Mod: S$GLB,,, | Performed by: UROLOGY

## 2022-10-25 PROCEDURE — 81003 URINALYSIS AUTO W/O SCOPE: CPT | Mod: QW,S$GLB,, | Performed by: UROLOGY

## 2022-10-25 PROCEDURE — 3079F PR MOST RECENT DIASTOLIC BLOOD PRESSURE 80-89 MM HG: ICD-10-PCS | Mod: CPTII,S$GLB,, | Performed by: UROLOGY

## 2022-10-25 PROCEDURE — 3079F DIAST BP 80-89 MM HG: CPT | Mod: CPTII,S$GLB,, | Performed by: UROLOGY

## 2022-10-25 PROCEDURE — 1160F RVW MEDS BY RX/DR IN RCRD: CPT | Mod: CPTII,S$GLB,, | Performed by: UROLOGY

## 2022-10-25 PROCEDURE — 1159F MED LIST DOCD IN RCRD: CPT | Mod: CPTII,S$GLB,, | Performed by: UROLOGY

## 2022-10-25 PROCEDURE — 3074F SYST BP LT 130 MM HG: CPT | Mod: CPTII,S$GLB,, | Performed by: UROLOGY

## 2022-10-25 PROCEDURE — 99999 PR PBB SHADOW E&M-EST. PATIENT-LVL IV: ICD-10-PCS | Mod: PBBFAC,,, | Performed by: UROLOGY

## 2022-10-25 PROCEDURE — 89320 SEMEN ANAL VOL/COUNT/MOT: CPT | Performed by: UROLOGY

## 2022-10-25 PROCEDURE — 81003 POCT URINALYSIS(INSTRUMENT): ICD-10-PCS | Mod: QW,S$GLB,, | Performed by: UROLOGY

## 2022-10-25 PROCEDURE — 1159F PR MEDICATION LIST DOCUMENTED IN MEDICAL RECORD: ICD-10-PCS | Mod: CPTII,S$GLB,, | Performed by: UROLOGY

## 2022-10-25 PROCEDURE — 99204 OFFICE O/P NEW MOD 45 MIN: CPT | Mod: S$GLB,,, | Performed by: UROLOGY

## 2022-10-25 PROCEDURE — 1160F PR REVIEW ALL MEDS BY PRESCRIBER/CLIN PHARMACIST DOCUMENTED: ICD-10-PCS | Mod: CPTII,S$GLB,, | Performed by: UROLOGY

## 2022-10-25 PROCEDURE — 3074F PR MOST RECENT SYSTOLIC BLOOD PRESSURE < 130 MM HG: ICD-10-PCS | Mod: CPTII,S$GLB,, | Performed by: UROLOGY

## 2022-10-25 NOTE — PROGRESS NOTES
[facility] Urology New Patient/H&P:     Alejo Zafar is a 36 y.o. male who presents for evaluation of nocturia/urinary frequency, and possible infertility    Followed for primary care by LISHA Lacey, last seen in may 2022 noting anxiety, genital warts being treated by derm, and snoring for which he was referred to pulm and had negative eval for WILLARD. Though messaged pcp 10/12/22 noting that although negative for WILLARD still urinating frequently at night, but also during the day, and requested urology eval. Also noted when trying to get pregnant with exwife could not so wanted to evaluate fertility    He reports AUA SS 20-21/5 unhappy (5: Frequency, weak stream; 4: Intermittency; 2-3: Sleeping; 2: Urgency; 1: Emptying, straining)  He stops fluid intake 2-3 hours before bed then urinates 2-3 times at night.  He has longstanding urinary frequency.  No known diabetes.  No A1c checked on recent labs.  Urinalysis dipstick is negative.  Frequent small volume voids.  DT F 5-7 times It has the urge and post bones, can feel bladder filling.  Goes every time he feels the need.  But if this tracks will forget.  Has good flow and no intermittent see if his bladder is full and he has not gone in a while, otherwise is frequent small volume voids are intermittent and weaker.  He drinks 1-2 cups of coffee in the morning and otherwise drinks water  No family history of prostate cancer.  He thinks that he had gonorrhea treated in the past.  Currently being treated for genital warts.  As well, about 6 7 years ago with his ex, was trying for 1 and half to 2 years to get pregnant, but not together anymore.  Concerned about fertility  Seven years ago he did body building with anabolic steroids/testosterone pills/supplements from a company.  Testosterone was checked found to be normal at 561 on 1/3/22.  No hot tubs.  No history of cryptorchidism    Past Medical History:   Diagnosis Date    Hyperlipidemia     Hypertension        Past Surgical  "History:   Procedure Laterality Date    WISDOM TOOTH EXTRACTION         Family History   Problem Relation Age of Onset    Stroke Mother 55    Hypertension Mother     Heart disease Maternal Grandfather     No Known Problems Father     Breast cancer Sister 38    Breast cancer Paternal Grandmother        Social History     Socioeconomic History    Marital status:    Occupational History    Occupation: IT-   Tobacco Use    Smoking status: Former     Years: 15.00     Types: Cigarettes     Quit date: 2011     Years since quittin.7    Smokeless tobacco: Never   Substance and Sexual Activity    Alcohol use: Yes     Comment: social, <1 week    Drug use: Yes     Types: Marijuana    Sexual activity: Yes     Partners: Female       Review of patient's allergies indicates:  No Known Allergies    Medications Reviewed: see MAR    Focused Physical Exam    Vitals:    10/25/22 0928   BP: 129/81   Pulse: 75     Body mass index is 31.42 kg/m². Weight: 99.3 kg (218 lb 15.4 oz) Height: 5' 10" (177.8 cm)       Abdomen: Soft, non-tender, nondistended, no CVA tenderness      LABS:    Recent Results (from the past 336 hour(s))   POCT Urinalysis(Instrument)    Collection Time: 10/25/22  9:35 AM   Result Value Ref Range    Color, POC UA Yellow Yellow, Straw, Colorless    Clarity, POC UA Clear Clear    Glucose, POC UA Negative Negative    Bilirubin, POC UA Negative Negative    Ketones, POC UA Negative Negative    Spec Grav POC UA 1.010 1.005 - 1.030    Blood, POC UA Negative Negative    pH, POC UA 7.0 5.0 - 8.0    Protein, POC UA Negative Negative    Urobilinogen, POC UA 0.2 <=1.0    Nitrite, POC UA Negative Negative    WBC, POC UA Negative Negative         Assessment/Diagnosis:    1. Nocturia  Ambulatory referral/consult to Urology    POCT Urinalysis(Instrument)    Uroflowmetry      2. Fertility testing  Ambulatory referral/consult to Urology    Semen Analysis          Plans:  Reviewed pcp medical record and labs  Extensively " reviewed his urinary urgency and frequency.  He inquired if he had overactive bladder.  There does seem to be some habitual or behavioral component to his voiding, noting that he does seem to void at 1st sensation or 1st desire rather than filling or strong desire, and notes that he can postpone and has better voiding which is almost normal when his bladder is full but is used to going frequently in small volumes she has been doing long-term.  We did discuss bladder retraining and habitual components of voiding.  We also discussed possible underlying etiology such as prostatic enlargement, which would be unusual at his age, also noting that he is not have any obstructive symptoms with a full bladder, and other obstructive concerns such as urethral stricture disease, which he does have risk factors with previously treated STD.  No known diabetes, but can be evaluated at next routine primary care labs.   He does also have baseline anxiety which is being treated for we did discuss dysfunctional voiding related to increased pelvic floor tone, often concurrent with anxiety, for which pelvic floor physical therapy as significant benefit. .    We briefly reviewed alpha blockers such as Flomax as medical therapy to relieve any obstructive symptoms, however noting retrograde ejaculation side effects, and thus use caution in a young good fertile man, especially with concerns about his fertility and would prefer not to be on additional medications will avoid at this time unless there is proven obstruction.  Return in regards to his fertility concerns, discussed baseline screening semen analysis.  Collection instructions and labeled specimen cup were provided, and will review.  If normal, no further workup.  If any abnormality, consent fertility center formal semen analysis and make appropriate referrals as needed.    After discussion of his voiding symptoms, he will work on some bladder retraining protocols and be conscious of  his voiding habits, allowing bladder to fill and not going to find a bathroom at 1st sensation, and will return in 1 month for office based noninvasive assessment of obstructive voiding with uroflow, and repeat symptom scores and check emptying.  If there is objective obstruction present, would consider cystoscopy to rule urethral stricture or anatomic concerns.  If there is no objective obstruction, would then make pelvic floor physical therapy referral to evaluate for dysfunctional voiding.    Total time spent in/on encounter today, including face to face time with patient, counseling, medical record review, interpretation of tests/results, , and treatment plan coordination: 45 minutes

## 2022-10-28 ENCOUNTER — PATIENT MESSAGE (OUTPATIENT)
Dept: UROLOGY | Facility: CLINIC | Age: 37
End: 2022-10-28
Payer: COMMERCIAL

## 2022-11-04 LAB
GERM CELLS, SEMEN: NORMAL
PH SMN: 8 [PH]
PMN'S/100 SPERMATAZOA: 1 %
SPECIMEN VOL SMN: 3.1 ML
SPERM # SMN: 229 M
SPERM # SMN: 73.8 M/ML
SPERM MOTILE NFR SMN: 77 %
SPERM NORM MOTILE # SMN: 28.2 M (ref 50–?)
SPERM NORM NFR SMN: 16 %
SPERM PROG NFR SMN: 54 %
SPERM SMN: NORMAL
VISC SMN QL: NORMAL
WBC # SMN: 0.74 M/ML (ref 0–1)

## 2022-11-16 ENCOUNTER — CLINICAL SUPPORT (OUTPATIENT)
Dept: UROLOGY | Facility: CLINIC | Age: 37
End: 2022-11-16
Payer: COMMERCIAL

## 2022-11-16 DIAGNOSIS — R35.1 NOCTURIA: Primary | ICD-10-CM

## 2022-11-16 DIAGNOSIS — N39.8 DYSFUNCTIONAL VOIDING OF URINE: ICD-10-CM

## 2022-11-16 LAB — POC RESIDUAL URINE VOLUME: 17 ML (ref 0–100)

## 2022-11-16 PROCEDURE — 99499 UNLISTED E&M SERVICE: CPT | Mod: S$GLB,,, | Performed by: UROLOGY

## 2022-11-16 PROCEDURE — 51798 US URINE CAPACITY MEASURE: CPT | Mod: S$GLB,,, | Performed by: UROLOGY

## 2022-11-16 PROCEDURE — 51741 PR UROFLOWMETRY, COMPLEX: ICD-10-PCS | Mod: S$GLB,,, | Performed by: UROLOGY

## 2022-11-16 PROCEDURE — 51741 ELECTRO-UROFLOWMETRY FIRST: CPT | Mod: S$GLB,,, | Performed by: UROLOGY

## 2022-11-16 PROCEDURE — 51798 POCT BLADDER SCAN: ICD-10-PCS | Mod: S$GLB,,, | Performed by: UROLOGY

## 2022-11-16 PROCEDURE — 99499 NO LOS: ICD-10-PCS | Mod: S$GLB,,, | Performed by: UROLOGY

## 2022-11-16 NOTE — PROGRESS NOTES
AUASS:  Incomplete emptying- 1  Frequency- 2  Intermittency- 3  Urgency- 2  Weak Stream- 2  Straining- 0  Sleeping- 1  Total-  10  QOL- 2  PVR- 17 ml     Uroflow results  Voiding time: 41.0s,   Flow time: 40.9s,   TTP flow: 11.8s,   Peak flowrate: 27.6 mL/s,   Average flowrate: 15.2mL/s,   Intervals: 1,   Voided volume: 621 mL,   Pattern of curve: to be determined by physician.       Per pt when trying to empty bladder voiced has to lift up testicle or under testicles to try and help with emptying bladder.

## 2022-11-29 ENCOUNTER — PATIENT MESSAGE (OUTPATIENT)
Dept: FAMILY MEDICINE | Facility: CLINIC | Age: 37
End: 2022-11-29

## 2022-12-13 ENCOUNTER — PATIENT MESSAGE (OUTPATIENT)
Dept: UROLOGY | Facility: CLINIC | Age: 37
End: 2022-12-13
Payer: COMMERCIAL

## 2022-12-14 NOTE — PROGRESS NOTES
Uroflow 11/17 reviewed with parameters documented with no obstructed pattern at all  1 solid voiding curve high peaking and good average >15cc/s without intermittency or concern for obstruction  Per discussion will refer to pfpt

## 2022-12-20 ENCOUNTER — OFFICE VISIT (OUTPATIENT)
Dept: FAMILY MEDICINE | Facility: CLINIC | Age: 37
End: 2022-12-20
Payer: COMMERCIAL

## 2022-12-20 ENCOUNTER — PATIENT MESSAGE (OUTPATIENT)
Dept: FAMILY MEDICINE | Facility: CLINIC | Age: 37
End: 2022-12-20

## 2022-12-20 ENCOUNTER — TELEPHONE (OUTPATIENT)
Dept: FAMILY MEDICINE | Facility: CLINIC | Age: 37
End: 2022-12-20

## 2022-12-20 DIAGNOSIS — M79.604 PAIN OF RIGHT LOWER EXTREMITY: ICD-10-CM
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
## 2022-12-20 PROCEDURE — 1159F MED LIST DOCD IN RCRD: CPT | Mod: CPTII,95,, | Performed by: PHYSICIAN ASSISTANT

## 2022-12-20 PROCEDURE — 99213 OFFICE O/P EST LOW 20 MIN: CPT | Mod: 95,,, | Performed by: PHYSICIAN ASSISTANT

## 2022-12-20 PROCEDURE — 1159F PR MEDICATION LIST DOCUMENTED IN MEDICAL RECORD: ICD-10-PCS | Mod: CPTII,95,, | Performed by: PHYSICIAN ASSISTANT

## 2022-12-20 PROCEDURE — 1160F RVW MEDS BY RX/DR IN RCRD: CPT | Mod: CPTII,95,, | Performed by: PHYSICIAN ASSISTANT

## 2022-12-20 PROCEDURE — 1160F PR REVIEW ALL MEDS BY PRESCRIBER/CLIN PHARMACIST DOCUMENTED: ICD-10-PCS | Mod: CPTII,95,, | Performed by: PHYSICIAN ASSISTANT

## 2022-12-20 PROCEDURE — 99213 PR OFFICE/OUTPT VISIT, EST, LEVL III, 20-29 MIN: ICD-10-PCS | Mod: 95,,, | Performed by: PHYSICIAN ASSISTANT

## 2022-12-20 NOTE — Clinical Note
Gris tried to reach you.  This patient needs to be evaluated in person, we booked him an urgent care apt [facility] but I know he would rather see you.   pip
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
## 2022-12-20 NOTE — TELEPHONE ENCOUNTER
----- Message from Tiki Diana sent at 12/20/2022  2:34 PM CST -----  Pt states that the provider he saw today recommended an x-ray. Can Jun put in orders for an x-ray. Pt will see  anyone if he has to so he can get the x-ray done. Pt has an appointment with urgent care tomorrow.  Pt #[phone]

## 2022-12-21 ENCOUNTER — OFFICE VISIT (OUTPATIENT)
Dept: FAMILY MEDICINE | Facility: CLINIC | Age: 37
End: 2022-12-21
Payer: COMMERCIAL

## 2022-12-21 VITALS
TEMPERATURE: 98 F | RESPIRATION RATE: 14 BRPM | OXYGEN SATURATION: 98 % | DIASTOLIC BLOOD PRESSURE: 64 MMHG | WEIGHT: 233.44 LBS | BODY MASS INDEX: 33.42 KG/M2 | HEART RATE: 96 BPM | HEIGHT: 70 IN | SYSTOLIC BLOOD PRESSURE: 120 MMHG

## 2022-12-21 DIAGNOSIS — S80.811A ABRASION OF RIGHT LOWER EXTREMITY, INITIAL ENCOUNTER: ICD-10-CM
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
## 2022-12-21 DIAGNOSIS — S80.11XA CONTUSION OF RIGHT LOWER EXTREMITY, INITIAL ENCOUNTER: Primary | ICD-10-CM

## 2022-12-21 PROCEDURE — 1159F MED LIST DOCD IN RCRD: CPT | Mod: CPTII,S$GLB,, | Performed by: FAMILY MEDICINE

## 2022-12-21 PROCEDURE — 99999 PR PBB SHADOW E&M-EST. PATIENT-LVL IV: CPT | Mod: PBBFAC,,, | Performed by: FAMILY MEDICINE

## 2022-12-21 PROCEDURE — 3008F PR BODY MASS INDEX (BMI) DOCUMENTED: ICD-10-PCS | Mod: CPTII,S$GLB,, | Performed by: FAMILY MEDICINE

## 2022-12-21 PROCEDURE — 1159F PR MEDICATION LIST DOCUMENTED IN MEDICAL RECORD: ICD-10-PCS | Mod: CPTII,S$GLB,, | Performed by: FAMILY MEDICINE

## 2022-12-21 PROCEDURE — 99213 OFFICE O/P EST LOW 20 MIN: CPT | Mod: S$GLB,,, | Performed by: FAMILY MEDICINE

## 2022-12-21 PROCEDURE — 3078F DIAST BP <80 MM HG: CPT | Mod: CPTII,S$GLB,, | Performed by: FAMILY MEDICINE

## 2022-12-21 PROCEDURE — 3008F BODY MASS INDEX DOCD: CPT | Mod: CPTII,S$GLB,, | Performed by: FAMILY MEDICINE

## 2022-12-21 PROCEDURE — 99213 PR OFFICE/OUTPT VISIT, EST, LEVL III, 20-29 MIN: ICD-10-PCS | Mod: S$GLB,,, | Performed by: FAMILY MEDICINE

## 2022-12-21 PROCEDURE — 3074F PR MOST RECENT SYSTOLIC BLOOD PRESSURE < 130 MM HG: ICD-10-PCS | Mod: CPTII,S$GLB,, | Performed by: FAMILY MEDICINE

## 2022-12-21 PROCEDURE — 1160F RVW MEDS BY RX/DR IN RCRD: CPT | Mod: CPTII,S$GLB,, | Performed by: FAMILY MEDICINE

## 2022-12-21 PROCEDURE — 3074F SYST BP LT 130 MM HG: CPT | Mod: CPTII,S$GLB,, | Performed by: FAMILY MEDICINE

## 2022-12-21 PROCEDURE — 1160F PR REVIEW ALL MEDS BY PRESCRIBER/CLIN PHARMACIST DOCUMENTED: ICD-10-PCS | Mod: CPTII,S$GLB,, | Performed by: FAMILY MEDICINE

## 2022-12-21 PROCEDURE — 99999 PR PBB SHADOW E&M-EST. PATIENT-LVL IV: ICD-10-PCS | Mod: PBBFAC,,, | Performed by: FAMILY MEDICINE

## 2022-12-21 PROCEDURE — 3078F PR MOST RECENT DIASTOLIC BLOOD PRESSURE < 80 MM HG: ICD-10-PCS | Mod: CPTII,S$GLB,, | Performed by: FAMILY MEDICINE
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
## 2022-12-21 NOTE — PATIENT INSTRUCTIONS
Ice or cold packs 3-4 times a day through Thursday night then if needed can switch to heat.    Ibuprofen as needed.    Xray if needed.     Clean wound 2 times a day then apply Neosporin ointment - call if any sign of infection.

## 2023-01-16 ENCOUNTER — OFFICE VISIT (OUTPATIENT)
Dept: URGENT CARE | Facility: CLINIC | Age: 38
End: 2023-01-16
Attending: NURSE PRACTITIONER
Payer: COMMERCIAL

## 2023-01-16 VITALS
WEIGHT: 232 LBS | SYSTOLIC BLOOD PRESSURE: 135 MMHG | BODY MASS INDEX: 33.21 KG/M2 | OXYGEN SATURATION: 99 % | DIASTOLIC BLOOD PRESSURE: 87 MMHG | RESPIRATION RATE: 18 BRPM | HEART RATE: 86 BPM | HEIGHT: 70 IN | TEMPERATURE: 99 F

## 2023-01-16 DIAGNOSIS — M54.50 ACUTE LEFT-SIDED LOW BACK PAIN WITHOUT SCIATICA: Primary | ICD-10-CM

## 2023-01-16 PROCEDURE — 1160F RVW MEDS BY RX/DR IN RCRD: CPT | Mod: CPTII,S$GLB,, | Performed by: NURSE PRACTITIONER

## 2023-01-16 PROCEDURE — 1159F MED LIST DOCD IN RCRD: CPT | Mod: CPTII,S$GLB,, | Performed by: NURSE PRACTITIONER

## 2023-01-16 PROCEDURE — 99204 OFFICE O/P NEW MOD 45 MIN: CPT | Mod: 25,S$GLB,, | Performed by: NURSE PRACTITIONER

## 2023-01-16 PROCEDURE — 3079F DIAST BP 80-89 MM HG: CPT | Mod: CPTII,S$GLB,, | Performed by: NURSE PRACTITIONER

## 2023-01-16 PROCEDURE — 3008F BODY MASS INDEX DOCD: CPT | Mod: CPTII,S$GLB,, | Performed by: NURSE PRACTITIONER

## 2023-01-16 PROCEDURE — 72100 XR LUMBAR SPINE 2 OR 3 VIEWS: ICD-10-PCS | Mod: S$GLB,,, | Performed by: RADIOLOGY

## 2023-01-16 PROCEDURE — 72100 X-RAY EXAM L-S SPINE 2/3 VWS: CPT | Mod: S$GLB,,, | Performed by: RADIOLOGY

## 2023-01-16 PROCEDURE — 3075F PR MOST RECENT SYSTOLIC BLOOD PRESS GE 130-139MM HG: ICD-10-PCS | Mod: CPTII,S$GLB,, | Performed by: NURSE PRACTITIONER

## 2023-01-16 PROCEDURE — 96372 THER/PROPH/DIAG INJ SC/IM: CPT | Mod: S$GLB,,, | Performed by: NURSE PRACTITIONER

## 2023-01-16 PROCEDURE — 3079F PR MOST RECENT DIASTOLIC BLOOD PRESSURE 80-89 MM HG: ICD-10-PCS | Mod: CPTII,S$GLB,, | Performed by: NURSE PRACTITIONER

## 2023-01-16 PROCEDURE — 3075F SYST BP GE 130 - 139MM HG: CPT | Mod: CPTII,S$GLB,, | Performed by: NURSE PRACTITIONER

## 2023-01-16 PROCEDURE — 1160F PR REVIEW ALL MEDS BY PRESCRIBER/CLIN PHARMACIST DOCUMENTED: ICD-10-PCS | Mod: CPTII,S$GLB,, | Performed by: NURSE PRACTITIONER

## 2023-01-16 PROCEDURE — 99204 PR OFFICE/OUTPT VISIT, NEW, LEVL IV, 45-59 MIN: ICD-10-PCS | Mod: 25,S$GLB,, | Performed by: NURSE PRACTITIONER

## 2023-01-16 PROCEDURE — 3008F PR BODY MASS INDEX (BMI) DOCUMENTED: ICD-10-PCS | Mod: CPTII,S$GLB,, | Performed by: NURSE PRACTITIONER

## 2023-01-16 PROCEDURE — 96372 PR INJECTION,THERAP/PROPH/DIAG2ST, IM OR SUBCUT: ICD-10-PCS | Mod: S$GLB,,, | Performed by: NURSE PRACTITIONER

## 2023-01-16 PROCEDURE — 1159F PR MEDICATION LIST DOCUMENTED IN MEDICAL RECORD: ICD-10-PCS | Mod: CPTII,S$GLB,, | Performed by: NURSE PRACTITIONER

## 2023-01-16 RX ORDER — MELOXICAM 15 MG/1
15 TABLET ORAL DAILY
Qty: 14 TABLET | Refills: 0 | Status: SHIPPED | OUTPATIENT
Start: 2023-01-16 | End: 2023-04-27

## 2023-01-16 RX ORDER — KETOROLAC TROMETHAMINE 30 MG/ML
30 INJECTION, SOLUTION INTRAMUSCULAR; INTRAVENOUS
Status: COMPLETED | OUTPATIENT
Start: 2023-01-16 | End: 2023-01-16

## 2023-01-16 RX ORDER — CYCLOBENZAPRINE HCL 5 MG
5 TABLET ORAL 3 TIMES DAILY PRN
Qty: 21 TABLET | Refills: 0 | Status: SHIPPED | OUTPATIENT
Start: 2023-01-16 | End: 2023-04-27

## 2023-01-16 RX ADMIN — KETOROLAC TROMETHAMINE 30 MG: 30 INJECTION, SOLUTION INTRAMUSCULAR; INTRAVENOUS at 03:01

## 2023-01-16 NOTE — PATIENT INSTRUCTIONS
Moist heat to lower back 15 minutes every 2 hours  Meloxicam 15mg once daily with food-do not take ibuprofen, aleve, motrin or other NSAID's while taking meloxicam  Flexeril 5mg tablet Take 1 tablet 3 times daily as needed for muscle spasms  A referral has been placed to orthopedics. They will call you to schedule an appointment

## 2023-01-16 NOTE — PROGRESS NOTES
"Subjective:       Patient ID: Alejo Zafar is a 37 y.o. male.    Vitals:  height is 5' 10" (1.778 m) and weight is 105.2 kg (232 lb). His oral temperature is 98.9 °F (37.2 °C). His blood pressure is 135/87 and his pulse is 86. His respiration is 18 and oxygen saturation is 99%.     Chief Complaint: Back Pain    37 year old male with c/o lower back pain that radiates to his right hip. Pt also states he feels better when he is making light movement, standing, and putting all his weight to his left side. The pain worsened after working out at the gym earlier today.       Back Pain  This is a new problem. The current episode started today. The problem occurs constantly. The problem is unchanged. The quality of the pain is described as stabbing, shooting, cramping, burning and aching. The pain radiates to the left thigh. The pain is at a severity of 8/10. The pain is moderate. The pain is The same all the time. The symptoms are aggravated by sitting, lying down, bending, coughing, position, standing and twisting. Stiffness is present All day. He has tried home exercises for the symptoms.     Musculoskeletal:  Positive for back pain.     Objective:      Physical Exam   Constitutional: He is oriented to person, place, and time.   HENT:   Head: Normocephalic and atraumatic.   Nose: Nose normal.   Mouth/Throat: Mucous membranes are moist.   Eyes: Conjunctivae are normal. Extraocular movement intact   Neck: Neck supple.   Cardiovascular: Normal rate, regular rhythm, normal heart sounds and normal pulses.   Pulmonary/Chest: Effort normal and breath sounds normal.   Abdominal: Normal appearance. Soft.   Musculoskeletal:      Thoracic back: Normal.      Lumbar back: He exhibits decreased range of motion, tenderness and spasm.        Back:    Neurological: He is alert and oriented to person, place, and time.   Skin: Skin is warm and dry. Capillary refill takes 2 to 3 seconds.   Psychiatric: His behavior is normal. Mood normal. "   Nursing note and vitals reviewed.      Assessment:       1. Acute left-sided low back pain without sciatica        Lumbar x-ray findings:   The alignment is normal.  The vertebral bodies are intact without evidence of fracture or compression.  There is disc space narrowing at L5-S1 which may be congenital or due to degenerative disc disease.  The rest of the disc are well maintained.  No spondylolisthesis is seen.     Impression:     Narrowing at L5-S1 may be congenital or due to degenerative disc disease.  Otherwise negative lumbosacral spine x-rays.  Plan:         Acute left-sided low back pain without sciatica  -     XR LUMBAR SPINE 2 OR 3 VIEWS; Future; Expected date: 01/16/2023  -     ketorolac injection 30 mg  -     meloxicam (MOBIC) 15 MG tablet; Take 1 tablet (15 mg total) by mouth once daily.  Dispense: 14 tablet; Refill: 0  -     cyclobenzaprine (FLEXERIL) 5 MG tablet; Take 1 tablet (5 mg total) by mouth 3 (three) times daily as needed for Muscle spasms.  Dispense: 21 tablet; Refill: 0  -     Ambulatory referral/consult to Orthopedics         Moist heat to lower back 15 minutes every 2 hours  Meloxicam 15mg once daily with food-do not take ibuprofen, aleve, motrin or other NSAID's while taking meloxicam  Flexeril 5mg tablet Take 1 tablet 3 times daily as needed for muscle spasms  A referral has been placed to orthopedics. They will call you to schedule an appointment

## 2023-02-01 ENCOUNTER — OFFICE VISIT (OUTPATIENT)
Dept: SPINE | Facility: CLINIC | Age: 38
End: 2023-02-01
Payer: COMMERCIAL

## 2023-02-01 VITALS — HEIGHT: 70 IN | BODY MASS INDEX: 33.21 KG/M2 | WEIGHT: 232 LBS

## 2023-02-01 DIAGNOSIS — M54.50 CHRONIC BILATERAL LOW BACK PAIN, UNSPECIFIED WHETHER SCIATICA PRESENT: Primary | ICD-10-CM

## 2023-02-01 DIAGNOSIS — G89.29 CHRONIC BILATERAL LOW BACK PAIN, UNSPECIFIED WHETHER SCIATICA PRESENT: Primary | ICD-10-CM

## 2023-02-01 PROCEDURE — 1160F RVW MEDS BY RX/DR IN RCRD: CPT | Mod: CPTII,S$GLB,, | Performed by: PHYSICAL MEDICINE & REHABILITATION

## 2023-02-01 PROCEDURE — 1160F PR REVIEW ALL MEDS BY PRESCRIBER/CLIN PHARMACIST DOCUMENTED: ICD-10-PCS | Mod: CPTII,S$GLB,, | Performed by: PHYSICAL MEDICINE & REHABILITATION

## 2023-02-01 PROCEDURE — 3008F PR BODY MASS INDEX (BMI) DOCUMENTED: ICD-10-PCS | Mod: CPTII,S$GLB,, | Performed by: PHYSICAL MEDICINE & REHABILITATION

## 2023-02-01 PROCEDURE — 99204 PR OFFICE/OUTPT VISIT, NEW, LEVL IV, 45-59 MIN: ICD-10-PCS | Mod: S$GLB,,, | Performed by: PHYSICAL MEDICINE & REHABILITATION

## 2023-02-01 PROCEDURE — 99204 OFFICE O/P NEW MOD 45 MIN: CPT | Mod: S$GLB,,, | Performed by: PHYSICAL MEDICINE & REHABILITATION

## 2023-02-01 PROCEDURE — 3008F BODY MASS INDEX DOCD: CPT | Mod: CPTII,S$GLB,, | Performed by: PHYSICAL MEDICINE & REHABILITATION

## 2023-02-01 PROCEDURE — 1159F PR MEDICATION LIST DOCUMENTED IN MEDICAL RECORD: ICD-10-PCS | Mod: CPTII,S$GLB,, | Performed by: PHYSICAL MEDICINE & REHABILITATION

## 2023-02-01 PROCEDURE — 1159F MED LIST DOCD IN RCRD: CPT | Mod: CPTII,S$GLB,, | Performed by: PHYSICAL MEDICINE & REHABILITATION

## 2023-02-01 NOTE — PROGRESS NOTES
SUBJECTIVE:    Patient ID: Alejo Zafar is a 37 y.o. male.    Chief Complaint: Low-back Pain    Day 37-year-old man who sees Freddy HUSAIN for his primary care.  Denies any chronic major medical problems.  No cancer history.  Works in IT.  Long history of intermittent self limited low back pain.  He says 2 weeks ago he did his usual exercise routine at the gym and a couple of days later started to feel some tightness and stiffness in the lower back.  He can not directly related to anything that he did.  Over the ensuing several days symptoms got worse and he ended up going to the urgent care on 23 with complaints of low back pain radiating into the posterior portion of the left thigh.  He was given a shot of Toradol and sent home on meloxicam and Flexeril.  X-rays done at that time show mild-to-moderate degenerative changes at L5-S1.  He says the following day after he went to the urgent care he was feeling better.  Although he can not directly attribute that to any intervention at the urgent care.  Regardless he presents to me feeling better.  He is not having any pain.  No bowel or bladder dysfunction fever chills sweats or unexpected weight loss.        Past Medical History:   Diagnosis Date    Hyperlipidemia     Hypertension      Social History     Socioeconomic History    Marital status:    Occupational History    Occupation: IT-   Tobacco Use    Smoking status: Former     Years: 15.00     Types: Cigarettes     Quit date: 2011     Years since quittin.9    Smokeless tobacco: Never   Substance and Sexual Activity    Alcohol use: Yes     Comment: social, <1 week    Drug use: Yes     Types: Marijuana    Sexual activity: Yes     Partners: Female     Past Surgical History:   Procedure Laterality Date    WISDOM TOOTH EXTRACTION       Family History   Problem Relation Age of Onset    Stroke Mother 55    Hypertension Mother     Heart disease Maternal Grandfather     No Known Problems  "Father     Breast cancer Sister 38    Breast cancer Paternal Grandmother      Vitals:    02/01/23 0806   Weight: 105.2 kg (232 lb)   Height: 5' 10" (1.778 m)       Review of Systems   Constitutional:  Negative for chills, diaphoresis, fatigue, fever and unexpected weight change.   HENT:  Negative for trouble swallowing.    Eyes:  Negative for visual disturbance.   Respiratory:  Negative for shortness of breath.    Cardiovascular:  Negative for chest pain.   Gastrointestinal:  Negative for abdominal pain, constipation, diarrhea, nausea and vomiting.   Genitourinary:  Negative for difficulty urinating.   Musculoskeletal:  Negative for arthralgias, back pain, gait problem, joint swelling, myalgias, neck pain and neck stiffness.   Neurological:  Negative for dizziness, speech difficulty, weakness, light-headedness, numbness and headaches.        Objective:      Physical Exam  Neurological:      Mental Status: He is alert and oriented to person, place, and time.      Comments: He is awake and in no acute distress  No point tenderness or palpable masses about the lumbar spine  Forward flexion and extension of the lumbar spine are normal and painless  He can heel and toe walk normally  Reflexes- +1-+2 reflexes at the following:   C5-Biceps   C6-Brachioradialis   C7-Triceps   L3/4-Patellar   S1-Achilles   Ese sign negative bilaterally  Strength testing- 5/5 strength in the following muscle groups:  C5-Elbow flexion  C6-Wrist extension  C7-Elbow extension  C8-Finger flexion  T1-Finger abduction  L2-Hip flexion  L3-Knee extension  L4-Ankle dorsiflexion  L5-Great toe extension  S1-Ankle plantar flexion       Straight leg raise negative bilaterally  SADIE test negative bilaterally           Assessment:       1. Chronic bilateral low back pain, unspecified whether sciatica present             Plan:     He has a nonfocal neurological examination and no historical red flags.  I suspect has intermittent low back pain on basis " of degenerative disc disease and facet arthropathy.  He is currently asymptomatic.  We talked about treatment options for back pain which include living with his ongoing symptoms versus physical therapy versus injection versus surgery.  He is not interested in any specific intervention at this time.  I recommend that he continue working out at the gym focusing on core strength and general fitness and maintaining a healthy weight.  He can follow up here on an as-needed basis      Chronic bilateral low back pain, unspecified whether sciatica present

## 2023-04-27 ENCOUNTER — OFFICE VISIT (OUTPATIENT)
Dept: FAMILY MEDICINE | Facility: CLINIC | Age: 38
End: 2023-04-27
Payer: COMMERCIAL

## 2023-04-27 DIAGNOSIS — R05.1 ACUTE COUGH: ICD-10-CM

## 2023-04-27 DIAGNOSIS — J01.00 ACUTE MAXILLARY SINUSITIS, RECURRENCE NOT SPECIFIED: Primary | ICD-10-CM

## 2023-04-27 PROCEDURE — 1159F PR MEDICATION LIST DOCUMENTED IN MEDICAL RECORD: ICD-10-PCS | Mod: CPTII,95,,

## 2023-04-27 PROCEDURE — 1159F MED LIST DOCD IN RCRD: CPT | Mod: CPTII,95,,

## 2023-04-27 PROCEDURE — 1160F RVW MEDS BY RX/DR IN RCRD: CPT | Mod: CPTII,95,,

## 2023-04-27 PROCEDURE — 1160F PR REVIEW ALL MEDS BY PRESCRIBER/CLIN PHARMACIST DOCUMENTED: ICD-10-PCS | Mod: CPTII,95,,

## 2023-04-27 PROCEDURE — 99214 OFFICE O/P EST MOD 30 MIN: CPT | Mod: 95,,,

## 2023-04-27 PROCEDURE — 99214 PR OFFICE/OUTPT VISIT, EST, LEVL IV, 30-39 MIN: ICD-10-PCS | Mod: 95,,,

## 2023-04-27 RX ORDER — AMOXICILLIN AND CLAVULANATE POTASSIUM 875; 125 MG/1; MG/1
1 TABLET, FILM COATED ORAL EVERY 12 HOURS
Qty: 14 TABLET | Refills: 0 | Status: SHIPPED | OUTPATIENT
Start: 2023-04-27 | End: 2023-05-04

## 2023-04-27 RX ORDER — TADALAFIL 20 MG/1
1 TABLET ORAL DAILY PRN
COMMUNITY
End: 2023-06-02 | Stop reason: SDUPTHER

## 2023-04-27 RX ORDER — BENZONATATE 200 MG/1
200 CAPSULE ORAL 3 TIMES DAILY PRN
Qty: 30 CAPSULE | Refills: 0 | Status: SHIPPED | OUTPATIENT
Start: 2023-04-27 | End: 2023-05-07

## 2023-04-27 NOTE — PROGRESS NOTES
Subjective:    The patient location is: [place], [place]  The chief complaint leading to consultation is: Cough, fatigue, and scratchy throat    Visit type: audiovisual    Face to Face time with patient: 10 minutes  15 minutes of total time spent on the encounter, which includes face to face time and non-face to face time preparing to see the patient (eg, review of tests), Obtaining and/or reviewing separately obtained history, Documenting clinical information in the electronic or other health record, Independently interpreting results (not separately reported) and communicating results to the patient/family/caregiver, or Care coordination (not separately reported).         Each patient to whom he or she provides medical services by telemedicine is:  (1) informed of the relationship between the physician and patient and the respective role of any other health care provider with respect to management of the patient; and (2) notified that he or she may decline to receive medical services by telemedicine and may withdraw from such care at any time.    Notes:           Patient ID: Alejo Zafar is a 37 y.o. male.    Chief Complaint: No chief complaint on file.    Patient presents to virtual visit with complaints of cough, fatigue, and scratchy throat. He started feeling sick on April 5, 2023.  He had dry cough, runny nose, scratchy throat and felt fatigued.  By April 7th he had lost his voice and continued to feel bad.  Cough became productive of yellow and green mucous.  He did take 2 at home covid tests which were negative. By April 14th he was feeling much better but still had a lingering cough. On April 18th he started to feel bad again with same symptoms of scratchy throat, fatigue, and cough. He denies fever or shortness of breath.     Review of patient's allergies indicates:  No Known Allergies  Social Determinants of Health     Tobacco Use: Medium Risk    Smoking Tobacco Use: Former    Smokeless Tobacco Use: Never     Passive Exposure: Not on file   Alcohol Use: Not on file   Financial Resource Strain: Not on file   Food Insecurity: Not on file   Transportation Needs: Not on file   Physical Activity: Not on file   Stress: Not on file   Social Connections: Not on file   Housing Stability: Not on file   Depression: Low Risk     Last PHQ Score: 0      Past Medical History:   Diagnosis Date    Hyperlipidemia     Hypertension       Past Surgical History:   Procedure Laterality Date    WISDOM TOOTH EXTRACTION        Social History     Socioeconomic History    Marital status:          Current Outpatient Medications:     amoxicillin-clavulanate 875-125mg (AUGMENTIN) 875-125 mg per tablet, Take 1 tablet by mouth every 12 (twelve) hours. for 7 days, Disp: 14 tablet, Rfl: 0    benzonatate (TESSALON) 200 MG capsule, Take 1 capsule (200 mg total) by mouth 3 (three) times daily as needed for Cough., Disp: 30 capsule, Rfl: 0    omega-3 fatty acids-fish oil 340-1,000 mg Cap, Take 1 capsule by mouth., Disp: , Rfl:     tadalafiL (CIALIS) 20 MG Tab, Take 1 tablet by mouth daily as needed., Disp: , Rfl:     Lab Results   Component Value Date    WBC 4.3 01/03/2022    HGB 14.7 01/03/2022    HCT 44.2 01/03/2022     01/03/2022    CHOL 206 (H) 04/07/2022    TRIG 119 04/07/2022    HDL 35 (L) 04/07/2022    ALT 38 04/07/2022    AST 24 04/07/2022     04/07/2022    K 4.6 04/07/2022     04/07/2022    CREATININE 1.19 04/07/2022    BUN 15 04/07/2022    CO2 23 04/07/2022       Review of Systems   Constitutional:  Positive for fatigue. Negative for chills and fever.   HENT:  Positive for nasal congestion, sinus pressure/congestion, sore throat and voice change.    Respiratory:  Positive for cough. Negative for shortness of breath.    Cardiovascular:  Negative for chest pain and palpitations.     Objective:      Physical Exam    Assessment:       1. Acute maxillary sinusitis, recurrence not specified    2. Acute cough        Plan:        Diagnoses and all orders for this visit:    Acute maxillary sinusitis, recurrence not specified  -     amoxicillin-clavulanate 875-125mg (AUGMENTIN) 875-125 mg per tablet; Take 1 tablet by mouth every 12 (twelve) hours. for 7 days    Acute cough  -     benzonatate (TESSALON) 200 MG capsule; Take 1 capsule (200 mg total) by mouth 3 (three) times daily as needed for Cough.    Start Augmentin for sinus infection. Take complete prescription even if your symptoms improve. Offered prednisone but patient declines steroids. Benzonatate for cough. If no improvement he should schedule an appointment in person.

## 2023-06-02 DIAGNOSIS — N52.9 ERECTILE DYSFUNCTION, UNSPECIFIED ERECTILE DYSFUNCTION TYPE: Primary | ICD-10-CM

## 2023-06-02 RX ORDER — TADALAFIL 20 MG/1
20 TABLET ORAL DAILY PRN
Qty: 12 TABLET | Refills: 0 | Status: SHIPPED | OUTPATIENT
Start: 2023-06-02 | End: 2023-07-31 | Stop reason: SDUPTHER

## 2023-06-02 NOTE — TELEPHONE ENCOUNTER
----- Message from Josue Rios MA sent at 6/2/2023 10:37 AM CDT -----  Regarding: refill  Pt needs cialis sent to [facility] on [facility]. [phone]

## 2023-06-15 ENCOUNTER — TELEPHONE (OUTPATIENT)
Dept: DERMATOLOGY | Facility: CLINIC | Age: 38
End: 2023-06-15
Payer: COMMERCIAL

## 2023-07-31 ENCOUNTER — OFFICE VISIT (OUTPATIENT)
Dept: FAMILY MEDICINE | Facility: CLINIC | Age: 38
End: 2023-07-31
Payer: COMMERCIAL

## 2023-07-31 VITALS
WEIGHT: 234 LBS | DIASTOLIC BLOOD PRESSURE: 80 MMHG | HEIGHT: 70 IN | HEART RATE: 68 BPM | BODY MASS INDEX: 33.5 KG/M2 | SYSTOLIC BLOOD PRESSURE: 124 MMHG

## 2023-07-31 DIAGNOSIS — N52.9 ERECTILE DYSFUNCTION, UNSPECIFIED ERECTILE DYSFUNCTION TYPE: ICD-10-CM

## 2023-07-31 DIAGNOSIS — B35.4 TINEA CORPORIS: ICD-10-CM

## 2023-07-31 DIAGNOSIS — Z79.899 ENCOUNTER FOR LONG-TERM (CURRENT) USE OF OTHER MEDICATIONS: Primary | ICD-10-CM

## 2023-07-31 PROCEDURE — 3074F SYST BP LT 130 MM HG: CPT | Mod: CPTII,S$GLB,, | Performed by: PHYSICIAN ASSISTANT

## 2023-07-31 PROCEDURE — 3079F PR MOST RECENT DIASTOLIC BLOOD PRESSURE 80-89 MM HG: ICD-10-PCS | Mod: CPTII,S$GLB,, | Performed by: PHYSICIAN ASSISTANT

## 2023-07-31 PROCEDURE — 99395 PREV VISIT EST AGE 18-39: CPT | Mod: S$GLB,,, | Performed by: PHYSICIAN ASSISTANT

## 2023-07-31 PROCEDURE — 99395 PR PREVENTIVE VISIT,EST,18-39: ICD-10-PCS | Mod: S$GLB,,, | Performed by: PHYSICIAN ASSISTANT

## 2023-07-31 PROCEDURE — 1159F MED LIST DOCD IN RCRD: CPT | Mod: CPTII,S$GLB,, | Performed by: PHYSICIAN ASSISTANT

## 2023-07-31 PROCEDURE — 1159F PR MEDICATION LIST DOCUMENTED IN MEDICAL RECORD: ICD-10-PCS | Mod: CPTII,S$GLB,, | Performed by: PHYSICIAN ASSISTANT

## 2023-07-31 PROCEDURE — 3074F PR MOST RECENT SYSTOLIC BLOOD PRESSURE < 130 MM HG: ICD-10-PCS | Mod: CPTII,S$GLB,, | Performed by: PHYSICIAN ASSISTANT

## 2023-07-31 PROCEDURE — 3079F DIAST BP 80-89 MM HG: CPT | Mod: CPTII,S$GLB,, | Performed by: PHYSICIAN ASSISTANT

## 2023-07-31 PROCEDURE — 3008F BODY MASS INDEX DOCD: CPT | Mod: CPTII,S$GLB,, | Performed by: PHYSICIAN ASSISTANT

## 2023-07-31 PROCEDURE — 3008F PR BODY MASS INDEX (BMI) DOCUMENTED: ICD-10-PCS | Mod: CPTII,S$GLB,, | Performed by: PHYSICIAN ASSISTANT

## 2023-07-31 RX ORDER — KETOCONAZOLE 20 MG/G
CREAM TOPICAL DAILY
Qty: 60 G | Refills: 1 | Status: SHIPPED | OUTPATIENT
Start: 2023-07-31

## 2023-07-31 RX ORDER — TADALAFIL 20 MG/1
20 TABLET ORAL DAILY PRN
Qty: 12 TABLET | Refills: 2 | Status: SHIPPED | OUTPATIENT
Start: 2023-07-31

## 2023-08-01 LAB
ALBUMIN SERPL-MCNC: 4.6 G/DL (ref 3.6–5.1)
ALBUMIN/GLOB SERPL: 1.8 (CALC) (ref 1–2.5)
ALP SERPL-CCNC: 42 U/L (ref 36–130)
ALT SERPL-CCNC: 28 U/L (ref 9–46)
APPEARANCE UR: CLEAR
AST SERPL-CCNC: 22 U/L (ref 10–40)
BACTERIA #/AREA URNS HPF: NORMAL /HPF
BACTERIA UR CULT: NORMAL
BILIRUB SERPL-MCNC: 1.5 MG/DL (ref 0.2–1.2)
BILIRUB UR QL STRIP: NEGATIVE
BUN SERPL-MCNC: 11 MG/DL (ref 7–25)
BUN/CREAT SERPL: ABNORMAL (CALC) (ref 6–22)
CALCIUM SERPL-MCNC: 9.4 MG/DL (ref 8.6–10.3)
CHLORIDE SERPL-SCNC: 104 MMOL/L (ref 98–110)
CHOLEST SERPL-MCNC: 181 MG/DL
CHOLEST/HDLC SERPL: 4.9 (CALC)
CO2 SERPL-SCNC: 29 MMOL/L (ref 20–32)
COLOR UR: YELLOW
CREAT SERPL-MCNC: 0.98 MG/DL (ref 0.6–1.26)
EGFR: 101 ML/MIN/1.73M2
ERYTHROCYTE [DISTWIDTH] IN BLOOD BY AUTOMATED COUNT: 14.7 % (ref 11–15)
GLOBULIN SER CALC-MCNC: 2.6 G/DL (CALC) (ref 1.9–3.7)
GLUCOSE SERPL-MCNC: 85 MG/DL (ref 65–99)
GLUCOSE UR QL STRIP: NEGATIVE
HCT VFR BLD AUTO: 42 % (ref 38.5–50)
HCV AB SERPL QL IA: NORMAL
HDLC SERPL-MCNC: 37 MG/DL
HGB BLD-MCNC: 13.5 G/DL (ref 13.2–17.1)
HGB UR QL STRIP: NEGATIVE
HIV 1+2 AB+HIV1 P24 AG SERPL QL IA: NORMAL
HYALINE CASTS #/AREA URNS LPF: NORMAL /LPF
KETONES UR QL STRIP: NEGATIVE
LDLC SERPL CALC-MCNC: 112 MG/DL (CALC)
LEUKOCYTE ESTERASE UR QL STRIP: NEGATIVE
MCH RBC QN AUTO: 26.9 PG (ref 27–33)
MCHC RBC AUTO-ENTMCNC: 32.1 G/DL (ref 32–36)
MCV RBC AUTO: 83.8 FL (ref 80–100)
NITRITE UR QL STRIP: NEGATIVE
NONHDLC SERPL-MCNC: 144 MG/DL (CALC)
PH UR STRIP: 6 [PH] (ref 5–8)
PLATELET # BLD AUTO: 199 THOUSAND/UL (ref 140–400)
PMV BLD REES-ECKER: 12.3 FL (ref 7.5–12.5)
POTASSIUM SERPL-SCNC: 4.3 MMOL/L (ref 3.5–5.3)
PROT SERPL-MCNC: 7.2 G/DL (ref 6.1–8.1)
PROT UR QL STRIP: NEGATIVE
RBC # BLD AUTO: 5.01 MILLION/UL (ref 4.2–5.8)
RBC #/AREA URNS HPF: NORMAL /HPF
SERVICE CMNT-IMP: NORMAL
SODIUM SERPL-SCNC: 140 MMOL/L (ref 135–146)
SP GR UR STRIP: 1.01 (ref 1–1.03)
SQUAMOUS #/AREA URNS HPF: NORMAL /HPF
TRIGL SERPL-MCNC: 204 MG/DL
TSH SERPL-ACNC: 2.01 MIU/L (ref 0.4–4.5)
WBC # BLD AUTO: 4.7 THOUSAND/UL (ref 3.8–10.8)
WBC #/AREA URNS HPF: NORMAL /HPF

## 2023-08-10 ENCOUNTER — PATIENT MESSAGE (OUTPATIENT)
Dept: DERMATOLOGY | Facility: CLINIC | Age: 38
End: 2023-08-10
Payer: COMMERCIAL

## 2023-08-23 ENCOUNTER — OFFICE VISIT (OUTPATIENT)
Dept: DERMATOLOGY | Facility: CLINIC | Age: 38
End: 2023-08-23
Payer: COMMERCIAL

## 2023-08-23 DIAGNOSIS — L82.1 SEBORRHEIC KERATOSES: ICD-10-CM

## 2023-08-23 DIAGNOSIS — B36.0 TINEA VERSICOLOR: ICD-10-CM

## 2023-08-23 DIAGNOSIS — L81.4 LENTIGO: ICD-10-CM

## 2023-08-23 DIAGNOSIS — D22.9 MULTIPLE BENIGN NEVI: Primary | ICD-10-CM

## 2023-08-23 DIAGNOSIS — Z86.018 HISTORY OF DYSPLASTIC NEVUS: ICD-10-CM

## 2023-08-23 DIAGNOSIS — D23.9 DERMATOFIBROMA: ICD-10-CM

## 2023-08-23 DIAGNOSIS — L90.5 SCAR: ICD-10-CM

## 2023-08-23 PROCEDURE — 1159F PR MEDICATION LIST DOCUMENTED IN MEDICAL RECORD: ICD-10-PCS | Mod: CPTII,S$GLB,, | Performed by: STUDENT IN AN ORGANIZED HEALTH CARE EDUCATION/TRAINING PROGRAM

## 2023-08-23 PROCEDURE — 1159F MED LIST DOCD IN RCRD: CPT | Mod: CPTII,S$GLB,, | Performed by: STUDENT IN AN ORGANIZED HEALTH CARE EDUCATION/TRAINING PROGRAM

## 2023-08-23 PROCEDURE — 99213 PR OFFICE/OUTPT VISIT, EST, LEVL III, 20-29 MIN: ICD-10-PCS | Mod: 25,S$GLB,, | Performed by: STUDENT IN AN ORGANIZED HEALTH CARE EDUCATION/TRAINING PROGRAM

## 2023-08-23 PROCEDURE — 87220 TISSUE EXAM FOR FUNGI: CPT | Mod: S$GLB,,, | Performed by: STUDENT IN AN ORGANIZED HEALTH CARE EDUCATION/TRAINING PROGRAM

## 2023-08-23 PROCEDURE — 87220 PR  TISSUE EXAM BY KOH: ICD-10-PCS | Mod: S$GLB,,, | Performed by: STUDENT IN AN ORGANIZED HEALTH CARE EDUCATION/TRAINING PROGRAM

## 2023-08-23 PROCEDURE — 1160F PR REVIEW ALL MEDS BY PRESCRIBER/CLIN PHARMACIST DOCUMENTED: ICD-10-PCS | Mod: CPTII,S$GLB,, | Performed by: STUDENT IN AN ORGANIZED HEALTH CARE EDUCATION/TRAINING PROGRAM

## 2023-08-23 PROCEDURE — 1160F RVW MEDS BY RX/DR IN RCRD: CPT | Mod: CPTII,S$GLB,, | Performed by: STUDENT IN AN ORGANIZED HEALTH CARE EDUCATION/TRAINING PROGRAM

## 2023-08-23 PROCEDURE — 99213 OFFICE O/P EST LOW 20 MIN: CPT | Mod: 25,S$GLB,, | Performed by: STUDENT IN AN ORGANIZED HEALTH CARE EDUCATION/TRAINING PROGRAM

## 2023-08-23 NOTE — PROGRESS NOTES
Subjective:      Patient ID:  Alejo Zafar is a 37 y.o. male who presents for   Chief Complaint   Patient presents with    Spot     Lower chest     LOV 8/18/22    Patient here today for skin check TBSE   Patient states he has a spot on lower chest x approx 2 months. Showed PCP and was given ketoconazole cream but did not start use.     Derm Hx:  Mildly atypical nevus, mid upper abdomen, monitor         Review of Systems   Constitutional:  Negative for fever, chills and fatigue.   Respiratory:  Negative for cough and shortness of breath.    Gastrointestinal:  Negative for nausea and vomiting.   Skin:  Positive for activity-related sunscreen use and wears hat. Negative for daily sunscreen use.   Hematologic/Lymphatic: Does not bruise/bleed easily.       Objective:   Physical Exam   Constitutional: He appears well-developed and well-nourished. No distress.   Neurological: He is alert and oriented to person, place, and time. He is not disoriented.   Psychiatric: He has a normal mood and affect.   Skin:   Areas Examined (abnormalities noted in diagram):   Scalp / Hair Palpated and Inspected  Head / Face Inspection Performed  Neck Inspection Performed  Chest / Axilla Inspection Performed  Abdomen Inspection Performed  Genitals / Buttocks / Groin Inspection Performed  Back Inspection Performed  RUE Inspected  LUE Inspection Performed  RLE Inspected  LLE Inspection Performed  Nails and Digits Inspection Performed                         Diagram Legend     Erythematous scaling macule/papule c/w actinic keratosis       Vascular papule c/w angioma      Pigmented verrucoid papule/plaque c/w seborrheic keratosis      Yellow umbilicated papule c/w sebaceous hyperplasia      Irregularly shaped tan macule c/w lentigo     1-2 mm smooth white papules consistent with Milia      Movable subcutaneous cyst with punctum c/w epidermal inclusion cyst      Subcutaneous movable cyst c/w pilar cyst      Firm pink to brown papule c/w  dermatofibroma      Pedunculated fleshy papule(s) c/w skin tag(s)      Evenly pigmented macule c/w junctional nevus     Mildly variegated pigmented, slightly irregular-bordered macule c/w mildly atypical nevus      Flesh colored to evenly pigmented papule c/w intradermal nevus       Pink pearly papule/plaque c/w basal cell carcinoma      Erythematous hyperkeratotic cursted plaque c/w SCC      Surgical scar with no sign of skin cancer recurrence      Open and closed comedones      Inflammatory papules and pustules      Verrucoid papule consistent consistent with wart     Erythematous eczematous patches and plaques     Dystrophic onycholytic nail with subungual debris c/w onychomycosis     Umbilicated papule    Erythematous-base heme-crusted tan verrucoid plaque consistent with inflamed seborrheic keratosis     Erythematous Silvery Scaling Plaque c/w Psoriasis     See annotation      Assessment / Plan:      History of dysplastic nevus  Scar  Multiple benign nevi  Careful dermoscopy evaluation of nevi performed with none identified as needing biopsy today  Monitor for new mole or moles that are becoming bigger, darker, irritated, or developing irregular borders.   Area of previous dysplastic nevus (mid upper abdomen) examined. Site well healed with no signs of recurrence.  Total body skin examination performed today including at least 12 points as noted in physical examination. No lesions suspicious for malignancy noted.  Patient instructed in importance in daily broad spectrum sun protection of at least spf 30. Mineral sunscreen ingredients preferred (Zinc +/- Titanium) and can be found OTC.   Patient encouraged to wear hat for all outdoor exposure.   Also discussed sun avoidance and use of protective clothing.    Seborrheic keratoses  These are benign inherited growths without a malignant potential. Reassurance given to patient. No treatment is necessary.     Tinea versicolor  Small macule on right upper abdomen  KOH  + for hyphae and yeast  Ketoconazole cream BID x 2 weeks as prescribed by PCP    Dermatofibroma  This is a benign scar-like lesion secondary to minor trauma. No treatment required.     Lentigo  This is a benign hyperpigmented sun induced lesion. Daily sun protection will reduce the number of new lesions. Treatment of these benign lesions are considered cosmetic.      He will message if needs to be squeezed in for treatment of condyloma       1 year    No follow-ups on file.

## 2023-09-15 ENCOUNTER — PATIENT MESSAGE (OUTPATIENT)
Dept: FAMILY MEDICINE | Facility: CLINIC | Age: 38
End: 2023-09-15

## 2023-09-15 DIAGNOSIS — R05.9 COUGH, UNSPECIFIED TYPE: Primary | ICD-10-CM

## 2023-09-15 RX ORDER — METHYLPREDNISOLONE 4 MG/1
TABLET ORAL
Qty: 21 EACH | Refills: 0 | Status: SHIPPED | OUTPATIENT
Start: 2023-09-15 | End: 2023-10-06

## 2023-09-15 RX ORDER — AZITHROMYCIN 250 MG/1
TABLET, FILM COATED ORAL
Qty: 6 TABLET | Refills: 0 | Status: SHIPPED | OUTPATIENT
Start: 2023-09-15 | End: 2023-09-20

## 2023-09-15 NOTE — TELEPHONE ENCOUNTER
Let us go ahead and have him start a Z-Kentrell as well as Medrol taper since symptoms have been lingering as long as they have.  Would definitely consider chest x-ray or pulmonology if symptoms persist

## 2023-12-21 ENCOUNTER — PATIENT MESSAGE (OUTPATIENT)
Dept: FAMILY MEDICINE | Facility: CLINIC | Age: 38
End: 2023-12-21
Payer: COMMERCIAL

## 2023-12-21 DIAGNOSIS — S46.911A RIGHT SHOULDER STRAIN, INITIAL ENCOUNTER: Primary | ICD-10-CM

## 2023-12-22 ENCOUNTER — HOSPITAL ENCOUNTER (OUTPATIENT)
Dept: RADIOLOGY | Facility: HOSPITAL | Age: 38
Discharge: HOME | End: 2023-12-22
Attending: PHYSICIAN ASSISTANT
Payer: COMMERCIAL

## 2023-12-22 DIAGNOSIS — S46.911A RIGHT SHOULDER STRAIN, INITIAL ENCOUNTER: ICD-10-CM

## 2023-12-22 PROCEDURE — 73030 X-RAY EXAM OF SHOULDER: CPT | Mod: TC,PO,RT

## 2023-12-22 NOTE — TELEPHONE ENCOUNTER
Can obtain right shoulder x-rays.  Might also recommend PT depending on findings.  If symptoms persist would warrant ortho eval and MRI

## 2024-02-12 ENCOUNTER — PATIENT MESSAGE (OUTPATIENT)
Dept: FAMILY MEDICINE | Facility: CLINIC | Age: 39
End: 2024-02-12
Payer: COMMERCIAL

## 2024-02-12 DIAGNOSIS — F41.9 ANXIETY: Primary | ICD-10-CM

## 2024-02-12 RX ORDER — ALPRAZOLAM 0.25 MG/1
0.25 TABLET ORAL 2 TIMES DAILY PRN
Qty: 30 TABLET | Refills: 0 | Status: CANCELLED | OUTPATIENT
Start: 2024-02-12 | End: 2024-03-13

## 2024-02-12 RX ORDER — ALPRAZOLAM 0.25 MG/1
0.25 TABLET ORAL 2 TIMES DAILY PRN
Qty: 30 TABLET | Refills: 0 | Status: SHIPPED | OUTPATIENT
Start: 2024-02-12 | End: 2024-06-03 | Stop reason: SDUPTHER

## 2024-03-11 ENCOUNTER — PATIENT MESSAGE (OUTPATIENT)
Dept: FAMILY MEDICINE | Facility: CLINIC | Age: 39
End: 2024-03-11
Payer: COMMERCIAL

## 2024-03-11 DIAGNOSIS — E78.00 ELEVATED CHOLESTEROL: ICD-10-CM

## 2024-03-11 DIAGNOSIS — M54.9 ACUTE BACK PAIN, UNSPECIFIED BACK LOCATION, UNSPECIFIED BACK PAIN LATERALITY: Primary | ICD-10-CM

## 2024-03-11 DIAGNOSIS — R07.89 OTHER CHEST PAIN: ICD-10-CM

## 2024-03-11 NOTE — TELEPHONE ENCOUNTER
Would recommend dedicated PT for the back. After that if no improvement then we would send to interventional pain management or physical medicine physician. Id also like to get him referred over to cardiology for the heart

## 2024-03-19 ENCOUNTER — CLINICAL SUPPORT (OUTPATIENT)
Dept: REHABILITATION | Facility: HOSPITAL | Age: 39
End: 2024-03-19
Payer: COMMERCIAL

## 2024-03-19 DIAGNOSIS — G89.29 CHRONIC BILATERAL LOW BACK PAIN WITHOUT SCIATICA: Primary | ICD-10-CM

## 2024-03-19 DIAGNOSIS — M54.9 ACUTE BACK PAIN, UNSPECIFIED BACK LOCATION, UNSPECIFIED BACK PAIN LATERALITY: ICD-10-CM

## 2024-03-19 DIAGNOSIS — Z74.09 IMPAIRED FUNCTIONAL MOBILITY AND ACTIVITY TOLERANCE: ICD-10-CM

## 2024-03-19 DIAGNOSIS — M54.50 CHRONIC BILATERAL LOW BACK PAIN WITHOUT SCIATICA: Primary | ICD-10-CM

## 2024-03-19 PROCEDURE — 97530 THERAPEUTIC ACTIVITIES: CPT | Mod: PN

## 2024-03-19 PROCEDURE — 97161 PT EVAL LOW COMPLEX 20 MIN: CPT | Mod: PN

## 2024-03-19 NOTE — PLAN OF CARE
[facility]   Physical Therapy Initial Evaluation     Name: Alejo Zafar  Clinic Number: [id number]    Therapy Diagnosis:   Encounter Diagnoses   Name Primary?    Acute back pain, unspecified back location, unspecified back pain laterality     Chronic bilateral low back pain without sciatica Yes    Impaired functional mobility and activity tolerance      Physician: Freddy Arana PA*     Physician Orders: PT Eval and Treat  Medical Diagnosis from Referral: M54.9 (ICD-10-CM) - Acute back pain, unspecified back location, unspecified back pain laterality   Evaluation Date: 3/19/2024  Authorization Period Expiration: 12/31/24  Plan of Care Expiration: 6/1/24    Progress Update: 4/20/24    Visit # / Visits authorized: 1 / 8     FOTO: Visit #1 - 1 / 3     PRECAUTIONS: Standard Precautions     MD Visit: /2023    Time In: 1000  Time Out: 1100  Total Appointment Time (timed & untimed codes): 60 minutes    SUBJECTIVE     Date of onset: 10 years     History of current condition - Alejo is a 38 y.o. male whom reports back pain a couple times a year for around 10 years  Active at the gym does heavy weights. Next day back hurts. Currently about 3 weeks of soreness that does not go away. Worse in the AM better through the day, feels better with activity, but once stops activity it comes back. No radiating symptoms . Is conscious of switching positions through the day   Crystals current exercise regiment includes: Gym .  Seeking Physical Therapy for get rid of back pain and prevent from returning .    MIHAI: Exercise   Falls: none  Physician Instructions (per patient): none   Other concerns: none    Imaging: No updated imaging for this episode of care:     Prior Therapy: N/A  Social History: Pt lives with their family  Living Environment: 1 story   ADLs unable to complete: none   Gym/Home Equipment: Gym   Occupation: Pt is IT sits most of the day   Prior Level of Function: Independent with all  ADLs  Current Level of Function: % of PLOF    Pain:  Current 4 /10, worst 7 /10, best 3 /10   Location: Low back Midline   Description: Aching  Aggravating Factors: Inactivity   Easing Factors: Activity     Pts goals: Pt reported goals are get rid of and prevent back pain from returning     _______________________________________________________    Medical History:   Past Medical History:   Diagnosis Date    Hyperlipidemia     Hypertension        Surgical History:   Alejo Zafar  has a past surgical history that includes [place] tooth extraction.    Medications:   Alejo has a current medication list which includes the following prescription(s): alprazolam, ketoconazole, omega-3 fatty acids-fish oil, and tadalafil.    Allergies:   Review of patient's allergies indicates:  No Known Allergies     OBJECTIVE   (x = not tested due to pain and/or inability to obtain test position)    RANGE OF MOTION:    Lumbar AROM/PROM Right  (spine) Left   Goal   Lumbar Flexion (60) 45  55  Initial:    Lumbar Extension (30) 20  30  Initial:    Lumbar Side Bending (25) 20 20 20  Initial:    Lumbar Rotation 5 5 5       Hip AROM/PROM Right   Left   Goal   Hip Flexion (120) 110 110 115  Initial:    Hip IR (45) 25 25 40  Initial:    Hip ER (45) 30 30 40  Initial:        Ankle/Foot AROM/PROM Right   Left   Goal   Dorsiflexion (20) 5 5 15  Initial:    Plantarflexion (50) 45 45 45  Initial:      STRENGTH:    L/E MMT Right   Goal   Hip Flexion  5/5 5/5 B    Hip Extension  5/5 5/5 B   Hip Abduction  5/5 5/5 B   Hip IR 5/5 5/5 B   Hip ER 5/5 5/5 B   Knee Flexion 5/5 5/5 B   Knee Extension 5/5 5/5 B   Ankle DF 5/5 5/5 B   Ankle PF 5/5 5/5 B     L/E MMT Left   Goal   Hip Flexion  5/5 5/5 B    Hip Extension  5/5 5/5 B   Hip Abduction  5/5 5/5 B   Hip IR 5/5 5/5 B   Hip ER 5/5 5/5 B   Knee Flexion 5/5 5/5 B   Knee Extension 5/5 5/5 B   Ankle DF 5/5 5/5 B   Ankle PF 5/5 5/5 B     MUSCLE LENGTH:     Muscle Tested  Right   Left    Goal   Hip Flexors   decreased decreased Normal B   Quadriceps normal normal Normal B   Hamstrings  normal normal Normal B   Piriformis  decreased decreased Normal B   Gastrocnemius  decreased decreased Normal B   Soleus  decreased decreased Normal B       SPECIAL TESTS:     Right Left    Goal   Slump - -- Negative B    SLR - - Negative B    Sacral Provocation  - - Negative B        Sensation:  Sensation is intact to light touch    Palpation: Increased tone and tenderness noted with palpation to: Midline Lumbar     Posture:  Pt presents with postural abnormalities which include: forward head, rounded shoulders , increased lumbar lordosis, rounded shoulders, and ankle/foot pronation    Forward Round Rounded Shoulder and Increased Thoracic Kyphosis    Gait Analysis: The patient ambulated with the following assistive device: none; the pt presents with the following gait abnormalities: decreased step length, michelle, and arm swing; increased forward flexed posture, trunk sway -     Movement Analysis:   Functional Test  Outcome   Double Leg Squat Good Depth and Form No pain   Single Leg Step Down NT          Balance: Balance:    RIGHT   LEFT   Goal   Closed NT --- 60 seconds     Tandem   20 seconds     Single Leg 10 10 20 seconds         FUNCTION:     CMS Impairment/Limitation/Restriction for FOTO Oswestry Survey    Therapist reviewed FOTO scores for Alejo Zafar on 3/19/2024.   FOTO documents entered into [facility] - see Media section.    Limitation Score: 45%         TREATMENT   Total Treatment time separate from Evaluation: (15) minutes     Alejo received the treatments listed below:      THERAPEUTIC EXERCISES: to develop strength, endurance, ROM, flexibility, posture and core stabilization for (15)  minutes including: x = performed today    TherEx 3/19/2024    Bridges with Blue TheraBand      Side Clams Blue TheraBand      Prone Press ups     Quad Hip Extenisons  Bird Dog       BOLD = new this visit  Plan for Next Visit:     Lower trunk  rotation with PhysioBall   Posterior pelvic tilt with Marches   Single leg bridges   Open Books  Hip Flexor stretch  Piriformis stretch  Side planks   HHR   Quadruped Hip extension  Bird Dog   Thoracic Extensions over roll     Single Leg RDL with KB   Reverse lunges With KB/Dumbell  GHD for Lumbar extension strength             PATIENT EDUCATION AND HOME EXERCISES     Education/Self-Care provided:  (included in treatment) minutes   Patient educated on the impairments noted above and the effects of physical therapy intervention to improve overall condition and QOL.   Patient was educated on all the above exercise prior/during/after for proper posture, positioning, and execution for safe performance with home exercise program.   Exercise/Activity modifications:   3/19/2024: EVAL:     Written Home Exercises Provided: yes. Prefers: Electronic Copies  Exercises were reviewed and Alejo was able to demonstrate them prior to the end of the session.  Alejo demonstrated good understanding of the education provided. See EMR under Patient Instructions for exercises provided during therapy sessions.    ASSESSMENT     Alejo is a 38 y.o. male referred to outpatient Physical Therapy with a medical diagnosis of Chronic low back pain exacerbation . Alejo presents with clinical signs and symptoms that support this diagnosis with . Decreased Lumbar ROM, decreased lower extremity strength, Lumbar joint(s) hypomobility, lower extremity neural tension, and impaired functional mobility. Radicular symptoms are not present..Decreased knee and hip ROM, decreased hip girdle, quad, and hamstring Strength, patellar joint hypomobility, increased joint and soft tissue edema, and impaired functional mobility.  Decreased foot and ankle ROM, decreased lower extremity strength, impaired joint mobility of talocural, subtalar, and forefoot joints, impaired balance, and impaired functional mobility.    The above impairments will be addressed through  manual therapy techniques, therapeutic exercises, functional training, and modalities as necessary. Patient was treated and educated on exercises for home program, progression of therapy, and benefits of therapy to achieve full functional mobility. Patient will benefit from skilled physical therapy to meet short and long term goals and return to prior level of function.    Pt prognosis is Good.   Pt will benefit from skilled outpatient Physical Therapy to address the deficits stated above and in the chart below, provide pt/family education, and to maximize pt's level of independence.     Plan of care discussed with patient: Yes  Pt's spiritual, cultural and educational needs considered and patient is agreeable to the plan of care and goals as stated below:     Anticipated Barriers for therapy: none  Medical Necessity is demonstrated by the following  History  Co-morbidities and personal factors that may impact the plan of care [x] LOW: no personal factors / co-morbidities  [] MODERATE: 1-2 personal factors / co-morbidities  [] HIGH: 3+ personal factors / co-morbidities    Moderate / High Support Documentation:      Examination  Body Structures and Functions, activity limitations and participation restrictions that may impact the plan of care [x] LOW: addressing 1-2 elements  [] MODERATE: 3+ elements  [] HIGH: 4+ elements (please support below)    Moderate / High Support Documentation:      Clinical Presentation [x] LOW: stable  [] MODERATE: Evolving  [] HIGH: Unstable     Decision Making/ Complexity Score: low       GOALS:  SHORT TERM GOALS:  2 weeks  Progress Date met   Recent signs and systems trend is improving in order to progress towards LTG's. [] Met  [] Not Met  [x] Progressing     Patient will be independent with HEP in order to further progress and return to maximal function. [] Met  [] Not Met  [x] Progressing     Pain rating at Worst: 5/10 in order to progress towards increased independence with activity.  [] Met  [] Not Met  [x] Progressing     Patient will be able to correct postural deviations in sitting and standing, to decrease pain and promote postural awareness for injury prevention.  [] Met  [] Not Met  [x] Progressing      [] Met  [] Not Met  [] Progressing        LONG TERM GOALS: 4 weeks  Progress Date met   Patient will return to normal ADL, recreational, and work related activities with less pain and limitation.  [] Met  [] Not Met  [x] Progressing     Patient will improve AROM to stated goals in order to return to maximal functional potential.  [] Met  [] Not Met  [x] Progressing     Patient will improve Strength to stated goals of appropriate musculature in order to improve functional independence.  [] Met  [] Not Met  [x] Progressing     Pain Rating at Best: 1/10 to improve Quality of Life. [] Met  [] Not Met  [x] Progressing     Patient will meet predicted functional outcome (FOTO) score: 15% to increase self-worth & perceived functional ability. [] Met  [] Not Met  [x] Progressing     Patient will have met/partially met personal goal of: No back pain with exercise or daily activities  [] Met  [] Not Met  [x] Progressing      [] Met  [] Not Met  [] Progressing            PLAN   Plan of care Certification: 3/19/2024 to 6/1/24    Outpatient Physical Therapy 2 times weekly for 4 weeks to include any combination of the following interventions: virtual visits, dry needling, modalities, electrical stimulation (IFC, Pre-Mod, Attended with Functional Dry Needling), Manual Therapy, Moist Heat/ Ice, Neuromuscular Re-ed, Patient Education, Self Care, Therapeutic Activities, Therapeutic Exercise, Functional Training, and Therapeutic Activites     Thank you for this referral.    Jesus Raygoza, PT      I CERTIFY THE NEED FOR THESE SERVICES FURNISHED UNDER THIS PLAN OF TREATMENT AND WHILE UNDER MY CARE   Physician's comments:     Physician's Signature: ___________________________________________________

## 2024-03-20 NOTE — PROGRESS NOTES
[facility]   Physical Therapy Treatment Note      Name: Alejo Zafar  Clinic Number: [id number]    Therapy Diagnosis:   Encounter Diagnoses   Name Primary?    Chronic bilateral low back pain without sciatica Yes    Impaired functional mobility and activity tolerance      Physician: Freddy Arana PA*    Visit Date: 3/21/2024    Physician Orders: PT Eval and Treat  Medical Diagnosis from Referral: M54.9 (ICD-10-CM) - Acute back pain, unspecified back location, unspecified back pain laterality   Evaluation Date: 3/19/2024  Authorization Period Expiration: 12/31/24  Plan of Care Expiration: 6/1/24                      Progress Update: 4/20/24                   Visit # / Visits authorized: 1 / 8                       FOTO: Visit #1 - 1 / 3      PRECAUTIONS: Standard Precautions      MD Visit: /2023     Time In: 1000  Time Out: 1100  Total Appointment Time (timed & untimed codes): 60 minutes     PTA Visit #: 1/5       Subjective     Patient reports: middle low back discomfort upon arrival.   He was compliant with home exercise program.  Response to previous treatment: no issues   Functional change: ongoing    Pain: 2/10  Location: bilateral back      Objective      Objective Measures updated at progress report unless specified.     Treatment     Alejo received the treatments listed below:      therapeutic exercises to develop strength, endurance, ROM, flexibility, posture, and core stabilization for 30 minutes including:    Lower trunk rotation with PhysioBall x 20   Posterior pelvic tilt with Marches x 20   Single leg bridges x 20 Bilateral   Open Books x 20 Bilateral   Hip Flexor stretch 3 x 30 sec Bilateral   Figure 4 stretch 3 x 30 sec Bilateral   HHR x 15    Thoracic Extensions over roll     neuromuscular re-education activities to improve: Coordination and Posture for 23 minutes. The following activities were included:    Quadruped Hip extension x 20 Bilateral   Bird Dog x 15  Bilateral   Side planks 3 x 30 sec   Single Leg RDL with 10#KB 3 x 8 Bilateral   Reverse lunges With KB/Dumbell  GHD for Lumbar extension strength      therapeutic activities to improve functional performance for 0  minutes, including:      Patient Education and Home Exercises       Education provided:   - home exercises     Written Home Exercises Provided: Patient instructed to cont prior HEP. Exercises were reviewed and Alejo was able to demonstrate them prior to the end of the session.  Alejo demonstrated good  understanding of the education provided. See Electronic Medical Record under Patient Instructions for exercises provided during therapy sessions    Assessment     Very challenged with core strength and stability. No back discomfort exacerbated throughout todays session. Encouraged patient to focus on hip flexibility, along with hip and core strength. Cueing needed to reduce lumbar lordosis during quadruped core exercises, especially on left side. Progress as tolerated.     Alejo Is progressing well towards his goals.   Patient prognosis is Good.     Patient will continue to benefit from skilled outpatient physical therapy to address the deficits listed in the problem list box on initial evaluation, provide pt/family education and to maximize pt's level of independence in the home and community environment.     Patient's spiritual, cultural and educational needs considered and pt agreeable to plan of care and goals.     Anticipated barriers to physical therapy: none     Goals:   SHORT TERM GOALS:  2 weeks  Progress Date met   Recent signs and systems trend is improving in order to progress towards LTG's. [] Met  [] Not Met  [x] Progressing     Patient will be independent with HEP in order to further progress and return to maximal function. [] Met  [] Not Met  [x] Progressing     Pain rating at Worst: 5/10 in order to progress towards increased independence with activity. [] Met  [] Not Met  [x]  Progressing     Patient will be able to correct postural deviations in sitting and standing, to decrease pain and promote postural awareness for injury prevention.  [] Met  [] Not Met  [x] Progressing       [] Met  [] Not Met  [] Progressing        LONG TERM GOALS: 4 weeks  Progress Date met   Patient will return to normal ADL, recreational, and work related activities with less pain and limitation.  [] Met  [] Not Met  [x] Progressing     Patient will improve AROM to stated goals in order to return to maximal functional potential.  [] Met  [] Not Met  [x] Progressing     Patient will improve Strength to stated goals of appropriate musculature in order to improve functional independence.  [] Met  [] Not Met  [x] Progressing     Pain Rating at Best: 1/10 to improve Quality of Life. [] Met  [] Not Met  [x] Progressing     Patient will meet predicted functional outcome (FOTO) score: 15% to increase self-worth & perceived functional ability. [] Met  [] Not Met  [x] Progressing     Patient will have met/partially met personal goal of: No back pain with exercise or daily activities  [] Met  [] Not Met  [x] Progressing       [] Met  [] Not Met  [] Progressing                Plan     Continue per Plan of Care     Jyoti Vences, PTA

## 2024-03-21 ENCOUNTER — CLINICAL SUPPORT (OUTPATIENT)
Dept: REHABILITATION | Facility: HOSPITAL | Age: 39
End: 2024-03-21
Payer: COMMERCIAL

## 2024-03-21 DIAGNOSIS — G89.29 CHRONIC BILATERAL LOW BACK PAIN WITHOUT SCIATICA: Primary | ICD-10-CM

## 2024-03-21 DIAGNOSIS — Z74.09 IMPAIRED FUNCTIONAL MOBILITY AND ACTIVITY TOLERANCE: ICD-10-CM

## 2024-03-21 DIAGNOSIS — M54.50 CHRONIC BILATERAL LOW BACK PAIN WITHOUT SCIATICA: Primary | ICD-10-CM

## 2024-03-21 PROCEDURE — 97110 THERAPEUTIC EXERCISES: CPT | Mod: PN,CQ

## 2024-03-21 PROCEDURE — 97112 NEUROMUSCULAR REEDUCATION: CPT | Mod: PN,CQ

## 2024-03-26 ENCOUNTER — OFFICE VISIT (OUTPATIENT)
Dept: FAMILY MEDICINE | Facility: CLINIC | Age: 39
End: 2024-03-26
Payer: COMMERCIAL

## 2024-03-26 DIAGNOSIS — R05.1 ACUTE COUGH: Primary | ICD-10-CM

## 2024-03-26 PROCEDURE — 99213 OFFICE O/P EST LOW 20 MIN: CPT | Mod: 95,,, | Performed by: PHYSICIAN ASSISTANT

## 2024-03-26 PROCEDURE — 1160F RVW MEDS BY RX/DR IN RCRD: CPT | Mod: CPTII,95,, | Performed by: PHYSICIAN ASSISTANT

## 2024-03-26 PROCEDURE — 1159F MED LIST DOCD IN RCRD: CPT | Mod: CPTII,95,, | Performed by: PHYSICIAN ASSISTANT

## 2024-03-26 RX ORDER — PROMETHAZINE HYDROCHLORIDE AND DEXTROMETHORPHAN HYDROBROMIDE 6.25; 15 MG/5ML; MG/5ML
5 SYRUP ORAL EVERY 6 HOURS PRN
Qty: 240 ML | Refills: 0 | Status: SHIPPED | OUTPATIENT
Start: 2024-03-26 | End: 2024-04-05

## 2024-03-26 NOTE — PROGRESS NOTES
The patient location is: [place]  The chief complaint leading to consultation is: sore throat    Visit type: audiovisual    Face to Face time with patient: 20 minutes of total time spent on the encounter, which includes face to face time and non-face to face time preparing to see the patient (eg, review of tests), Obtaining and/or reviewing separately obtained history, Documenting clinical information in the electronic or other health record, Independently interpreting results (not separately reported) and communicating results to the patient/family/caregiver, or Care coordination (not separately reported).         Each patient to whom he or she provides medical services by telemedicine is:  (1) informed of the relationship between the physician and patient and the respective role of any other health care provider with respect to management of the patient; and (2) notified that he or she may decline to receive medical services by telemedicine and may withdraw from such care at any time.    Notes:  Patient states he started feeling bad about 2 days ago but symptoms really escalated yesterday.  He complains of sore throat, runny nose, headache and a cough.  He states his mother and grandmother recently diagnosed with COVID and although he has not seen them since the diagnosis he was around them before they were diagnosed.  He denies any fever or body aches.  He has not tried taking anything over-the-counter.  He states the cough is getting worse but he denies any shortness a breath or wheezing.  He has not taken a home COVID test.  He states his mom is going to the pharmacy today and will pick him up 1 and he would like to defer us testing him for flu and COVID for now.    Alejo was seen today for sore throat.    Diagnoses and all orders for this visit:    Acute cough  Comments:  Possible COVID.  Patient to take a home test and let us know if he would prefer swab done by us.  Orders:  -      promethazine-dextromethorphan (PROMETHAZINE-DM) 6.25-15 mg/5 mL Syrp; Take 5 mLs by mouth every 6 (six) hours as needed.     Recommend Robitussin DM honey for daytime use.  Patient to let us know if symptoms worsen and the results of his home COVID test.    Answers submitted by the patient for this visit:  Sore Throat Questionnaire (Submitted on 3/26/2024)  Chief Complaint: Sore throat  Chronicity: new  Onset: yesterday  Progression since onset: gradually worsening  Pain worse on: neither  Fever: no fever  Pain - numeric: 6/10  abdominal pain: No  congestion: Yes  cough: Yes  diarrhea: No  drooling: No  ear discharge: No  ear pain: Yes  headaches: Yes  hoarse voice: Yes  neck pain: No  plugged ear sensation: No  shortness of breath: Yes  stridor: Yes  swollen glands: No  trouble swallowing: Yes  vomiting: No  strep: No  mono: No  Treatments tried: nothing  Pain severity: moderate

## 2024-04-03 ENCOUNTER — PATIENT MESSAGE (OUTPATIENT)
Dept: FAMILY MEDICINE | Facility: CLINIC | Age: 39
End: 2024-04-03
Payer: COMMERCIAL

## 2024-04-03 DIAGNOSIS — F41.9 ANXIETY: Primary | ICD-10-CM

## 2024-04-04 ENCOUNTER — CLINICAL SUPPORT (OUTPATIENT)
Dept: REHABILITATION | Facility: HOSPITAL | Age: 39
End: 2024-04-04
Payer: COMMERCIAL

## 2024-04-04 DIAGNOSIS — M54.50 CHRONIC BILATERAL LOW BACK PAIN WITHOUT SCIATICA: Primary | ICD-10-CM

## 2024-04-04 DIAGNOSIS — G89.29 CHRONIC BILATERAL LOW BACK PAIN WITHOUT SCIATICA: Primary | ICD-10-CM

## 2024-04-04 DIAGNOSIS — Z74.09 IMPAIRED FUNCTIONAL MOBILITY AND ACTIVITY TOLERANCE: ICD-10-CM

## 2024-04-04 PROCEDURE — 97112 NEUROMUSCULAR REEDUCATION: CPT | Mod: PN

## 2024-04-04 PROCEDURE — 97110 THERAPEUTIC EXERCISES: CPT | Mod: PN

## 2024-04-04 RX ORDER — ESCITALOPRAM OXALATE 10 MG/1
TABLET ORAL
Qty: 30 TABLET | Refills: 1 | Status: SHIPPED | OUTPATIENT
Start: 2024-04-04 | End: 2024-06-03 | Stop reason: SDUPTHER

## 2024-04-04 NOTE — TELEPHONE ENCOUNTER
Lets trial him with lexapro 10mg. Want him to start at 5mg (1/2 of pill initially for 2 weeks and then increase to full pill) f/u with me in 8 weeks if agreeable

## 2024-04-04 NOTE — PROGRESS NOTES
[facility]   Physical Therapy Treatment Note      Name: Alejo Zafar  Clinic Number: [id number]    Therapy Diagnosis:   Encounter Diagnoses   Name Primary?    Chronic bilateral low back pain without sciatica Yes    Impaired functional mobility and activity tolerance      Physician: Freddy Arana PA*    Visit Date: 4/4/2024    Physician Orders: PT Eval and Treat  Medical Diagnosis from Referral: M54.9 (ICD-10-CM) - Acute back pain, unspecified back location, unspecified back pain laterality   Evaluation Date: 3/19/2024  Authorization Period Expiration: 12/31/24  Plan of Care Expiration: 6/1/24                      Progress Update: 4/20/24                   Visit # / Visits authorized: 2 / 8                       FOTO: Visit #1 - 1 / 3      PRECAUTIONS: Standard Precautions      MD Visit: /2023     Time In: 1000  Time Out: 1100  Total Appointment Time (timed & untimed codes): 60 minutes     PTA Visit #: 0/5       Subjective     Patient reports: having stiffness and discomfort in the morning   He was compliant with home exercise program.  Response to previous treatment: no issues   Functional change: ongoing    Pain: 2/10  Location: bilateral back      Objective      Objective Measures updated at progress report unless specified.     Treatment     Alejo received the treatments listed below:      therapeutic exercises to develop strength, endurance, ROM, flexibility, posture, and core stabilization for 30 minutes including:    Lower trunk rotation with PhysioBall x 20   Posterior pelvic tilt with Marches x 20   Single leg bridges x 20 Bilateral   Open Books x 20 Bilateral   Hip Flexor stretch 3 x 30 sec Bilateral   Figure 4 stretch 3 x 30 sec Bilateral   HHR x 15    Thoracic Extensions over roll     neuromuscular re-education activities to improve: Coordination and Posture for 23 minutes. The following activities were included:    Quadruped Hip extension x 20 Bilateral   Bird Dog x  15 Bilateral   Side planks 3 x 30 sec   Single Leg RDL with 10#KB 3 x 8 Bilateral   Reverse lunges With KB/Dumbell  Precor Lumbar extension  GHD for Lumbar extension strength      therapeutic activities to improve functional performance for 0  minutes, including:      Patient Education and Home Exercises       Education provided:   - home exercises     Written Home Exercises Provided: Patient instructed to cont prior HEP. Exercises were reviewed and Alejo was able to demonstrate them prior to the end of the session.  Alejo demonstrated good  understanding of the education provided. See Electronic Medical Record under Patient Instructions for exercises provided during therapy sessions    Assessment     Continue to address core strength and control, patient is able to do most with minimal cueing Gave a program to help transition him back to gym workouts . Progress as tolerated.     Alejo Is progressing well towards his goals.   Patient prognosis is Good.     Patient will continue to benefit from skilled outpatient physical therapy to address the deficits listed in the problem list box on initial evaluation, provide pt/family education and to maximize pt's level of independence in the home and community environment.     Patient's spiritual, cultural and educational needs considered and pt agreeable to plan of care and goals.     Anticipated barriers to physical therapy: none     Goals:   SHORT TERM GOALS:  2 weeks  Progress Date met   Recent signs and systems trend is improving in order to progress towards LTG's. [] Met  [] Not Met  [x] Progressing     Patient will be independent with HEP in order to further progress and return to maximal function. [] Met  [] Not Met  [x] Progressing     Pain rating at Worst: 5/10 in order to progress towards increased independence with activity. [] Met  [] Not Met  [x] Progressing     Patient will be able to correct postural deviations in sitting and standing, to decrease pain and  promote postural awareness for injury prevention.  [] Met  [] Not Met  [x] Progressing       [] Met  [] Not Met  [] Progressing        LONG TERM GOALS: 4 weeks  Progress Date met   Patient will return to normal ADL, recreational, and work related activities with less pain and limitation.  [] Met  [] Not Met  [x] Progressing     Patient will improve AROM to stated goals in order to return to maximal functional potential.  [] Met  [] Not Met  [x] Progressing     Patient will improve Strength to stated goals of appropriate musculature in order to improve functional independence.  [] Met  [] Not Met  [x] Progressing     Pain Rating at Best: 1/10 to improve Quality of Life. [] Met  [] Not Met  [x] Progressing     Patient will meet predicted functional outcome (FOTO) score: 15% to increase self-worth & perceived functional ability. [] Met  [] Not Met  [x] Progressing     Patient will have met/partially met personal goal of: No back pain with exercise or daily activities  [] Met  [] Not Met  [x] Progressing       [] Met  [] Not Met  [] Progressing                Plan     Continue per Plan of Care     Jesus Raygoza, PT

## 2024-04-15 ENCOUNTER — OFFICE VISIT (OUTPATIENT)
Dept: CARDIOLOGY | Facility: CLINIC | Age: 39
End: 2024-04-15
Payer: COMMERCIAL

## 2024-04-15 VITALS
SYSTOLIC BLOOD PRESSURE: 134 MMHG | WEIGHT: 236.38 LBS | OXYGEN SATURATION: 98 % | HEART RATE: 98 BPM | BODY MASS INDEX: 33.92 KG/M2 | DIASTOLIC BLOOD PRESSURE: 90 MMHG

## 2024-04-15 DIAGNOSIS — R07.89 OTHER CHEST PAIN: ICD-10-CM

## 2024-04-15 DIAGNOSIS — R06.00 DYSPNEA, UNSPECIFIED TYPE: Primary | ICD-10-CM

## 2024-04-15 DIAGNOSIS — E78.00 ELEVATED CHOLESTEROL: ICD-10-CM

## 2024-04-15 PROCEDURE — 99999 PR PBB SHADOW E&M-EST. PATIENT-LVL III: CPT | Mod: PBBFAC,,, | Performed by: INTERNAL MEDICINE

## 2024-04-15 PROCEDURE — 1160F RVW MEDS BY RX/DR IN RCRD: CPT | Mod: CPTII,S$GLB,, | Performed by: INTERNAL MEDICINE

## 2024-04-15 PROCEDURE — 3080F DIAST BP >= 90 MM HG: CPT | Mod: CPTII,S$GLB,, | Performed by: INTERNAL MEDICINE

## 2024-04-15 PROCEDURE — 1159F MED LIST DOCD IN RCRD: CPT | Mod: CPTII,S$GLB,, | Performed by: INTERNAL MEDICINE

## 2024-04-15 PROCEDURE — 3075F SYST BP GE 130 - 139MM HG: CPT | Mod: CPTII,S$GLB,, | Performed by: INTERNAL MEDICINE

## 2024-04-15 PROCEDURE — 99203 OFFICE O/P NEW LOW 30 MIN: CPT | Mod: S$GLB,,, | Performed by: INTERNAL MEDICINE

## 2024-04-15 PROCEDURE — 3008F BODY MASS INDEX DOCD: CPT | Mod: CPTII,S$GLB,, | Performed by: INTERNAL MEDICINE

## 2024-04-15 PROCEDURE — 93000 ELECTROCARDIOGRAM COMPLETE: CPT | Mod: S$GLB,,, | Performed by: INTERNAL MEDICINE

## 2024-04-15 NOTE — PROGRESS NOTES
Subjective:    Patient ID:  Alejo Zafar is a 38 y.o. male patient here for evaluation Palpitations (Trend pattern for bending down and getting up really fast ) and Shortness of Breath (Trend pattern for bending down and getting up really fast )      History of Present Illness:     38-year-old male came here for initial evaluation of palpitations.  Patient has chronic palpitations for several years.  And had Holter monitor about 2 years ago which showed rare ectopy.  Patient stated he still has same type of palpitations which feels as thumping extra beat lasting 1second occurring about 3 times per week and  he mostly notices it when he takes Cialis or standing up suddenly.  He is physically active and exercises in the gym.  Occasionally has shortness for breath with stair exercises.  He had ECG stress test 3 years ago which was nondiagnostic.         Review of patient's allergies indicates:  No Known Allergies    Past Medical History:   Diagnosis Date    Hyperlipidemia     Hypertension      Past Surgical History:   Procedure Laterality Date    WISDOM TOOTH EXTRACTION       Social History     Tobacco Use    Smoking status: Former     Current packs/day: 0.00     Types: Cigarettes     Start date: 1996     Quit date: 2011     Years since quittin.1    Smokeless tobacco: Never   Substance Use Topics    Alcohol use: Yes     Alcohol/week: 2.0 standard drinks of alcohol     Types: 2 Cans of beer per week     Comment: social, <1 week    Drug use: Yes     Frequency: 7.0 times per week     Types: Marijuana        Review of Systems   Negative except as mentioned in HPI         Objective        Vitals:    04/15/24 1147   BP: (!) 134/90   Pulse: 98       LIPIDS - LAST 2   Lab Results   Component Value Date    CHOL 181 2023    CHOL 206 (H) 2022    HDL 37 (L) 2023    HDL 35 (L) 2022    LDLCALC 112 (H) 2023    LDLCALC 147 (H) 2022    TRIG 204 (H) 2023    TRIG 119 2022  "   CHOLHDL 4.9 07/31/2023    CHOLHDL 5.9 (H) 04/07/2022       CBC - LAST 2  Lab Results   Component Value Date    WBC 4.7 07/31/2023    WBC 4.3 01/03/2022    RBC 5.01 07/31/2023    RBC 5.42 01/03/2022    HGB 13.5 07/31/2023    HGB 14.7 01/03/2022    HCT 42.0 07/31/2023    HCT 44.2 01/03/2022    MCV 83.8 07/31/2023    MCV 81.5 01/03/2022    MCH 26.9 (L) 07/31/2023    MCH 27.1 01/03/2022    MCHC 32.1 07/31/2023    MCHC 33.3 01/03/2022    RDW 14.7 07/31/2023    RDW 14.3 01/03/2022     07/31/2023     01/03/2022    MPV 12.3 07/31/2023    MPV 11.9 01/03/2022    GRAN 3.9 02/11/2021    GRAN 71.7 02/11/2021    LYMPH 1,062 01/03/2022    LYMPH 24.7 01/03/2022    MONO 434 01/03/2022    MONO 10.1 01/03/2022    BASO 39 01/03/2022    BASO 0.05 02/11/2021    NRBC 0 02/11/2021       CHEMISTRY & LIVER FUNCTION - LAST 2  Lab Results   Component Value Date     07/31/2023     04/07/2022    K 4.3 07/31/2023    K 4.6 04/07/2022     07/31/2023     04/07/2022    CO2 29 07/31/2023    CO2 23 04/07/2022    ANIONGAP 9 02/11/2021    BUN 11 07/31/2023    BUN 15 04/07/2022    CREATININE 0.98 07/31/2023    CREATININE 1.19 04/07/2022    GLU 85 07/31/2023    GLU 87 04/07/2022    CALCIUM 9.4 07/31/2023    CALCIUM 9.9 04/07/2022    ALBUMIN 4.6 07/31/2023    ALBUMIN 4.8 04/07/2022    PROT 7.2 07/31/2023    PROT 7.4 04/07/2022    ALKPHOS 45 (L) 02/11/2021    ALKPHOS 44 10/03/2019    ALT 28 07/31/2023    ALT 38 04/07/2022    AST 22 07/31/2023    AST 24 04/07/2022    BILITOT 1.5 (H) 07/31/2023    BILITOT 1.5 (H) 04/07/2022        CARDIAC PROFILE - LAST 2  No results found for: "BNP", "CPK", "CPKMB", "LDH", "TROPONINI", "TROPONINIHS"     COAGULATION - LAST 2  No results found for: "LABPT", "INR", "APTT"    ENDOCRINE & PSA - LAST 2  No results found for: "HGBA1C", "MICROALBUR", "TSH", "PROCAL", "PSA"     ECHOCARDIOGRAM RESULTS  No results found for this or any previous visit.      CURRENT/PREVIOUS VISIT EKG  No results " found for this or any previous visit.  No valid procedures specified.   Results for orders placed in visit on 09/17/21    Exercise Stress - EKG    Interpretation Summary    The EKG portion of this study is abnormal but not diagnostic.    The patient reported no chest pain during the stress test.    The blood pressure response to stress was normal.    There were no arrhythmias during stress.    No valid procedures specified.          PREVIOUS STRESS TEST              PREVIOUS ANGIOGRAM        PHYSICAL EXAM    CONSTITUTIONAL: Well built, well nourished in no apparent distress  HEENT: No pallor  NECK: no JVD  LUNGS: CTA b/l  HEART: regular rate and rhythm, S1, S2 normal, no murmur   ABDOMEN:  Nondistended  EXTREMITIES: No edema  NEURO: AAO X 3   SKIN:  No rash  Psych:  Normal affect    I HAVE REVIEWED :    The vital signs, nurses notes, and all the pertinent radiology and labs.        Current Outpatient Medications   Medication Instructions    ALPRAZolam (XANAX) 0.25 mg, Oral, 2 times daily PRN    creatine monohydrate Powd 6 g, Oral, Daily    EScitalopram oxalate (LEXAPRO) 10 MG tablet Take 1/2 tab (5mg) by mouth daily for first 2 weeks, then increase to 1 tab (10mg) daily    omega-3 fatty acids-fish oil 340-1,000 mg Cap 1 capsule, Oral    tadalafiL (CIALIS) 20 mg, Oral, Daily PRN        ECG reviewed by me:  Normal sinus rhythm, possible early repolarization  Assessment & Plan     History of nondiagnostic stress test  Chronic Palpitations secondary to rare ectopy.  Dyslipidemia    Plan:  Echocardiogram  Exercise stress echo  Recheck lipid profile      Follow up in about 4 weeks (around 5/13/2024).

## 2024-04-16 ENCOUNTER — PATIENT MESSAGE (OUTPATIENT)
Dept: CARDIOLOGY | Facility: CLINIC | Age: 39
End: 2024-04-16
Payer: COMMERCIAL

## 2024-04-22 ENCOUNTER — TELEPHONE (OUTPATIENT)
Dept: CARDIOLOGY | Facility: CLINIC | Age: 39
End: 2024-04-22
Payer: COMMERCIAL

## 2024-04-22 NOTE — TELEPHONE ENCOUNTER
----- Message from Maude Baig RN sent at 2024  5:39 AM CDT -----  Regardin/15 EKG Order  The EKG performed on 4/15/24 is missing an order.  Please place an order so that the EKG can be read in [place].    Thank you,  Maude Baig RN  [facility]   [facility]  3rd [facility]  [phone]/ [id number]

## 2024-04-24 ENCOUNTER — TELEPHONE (OUTPATIENT)
Dept: CARDIOLOGY | Facility: CLINIC | Age: 39
End: 2024-04-24
Payer: COMMERCIAL

## 2024-04-24 NOTE — TELEPHONE ENCOUNTER
----- Message from Rowena Lino sent at 4/24/2024  4:19 PM CDT -----  Contact: Self  Type:  Patient Returning Call    Who Called:  Patient  Who Left Message for Patient:  N/A-asking to speak to the office  Does the patient know what this is regarding?:  yes, tests that were ordered  Best Call Back Number:  [phone]  Additional Information:  Pt is needing to check on the status of the tests that were ordered, stated the insurance was needing additional information before authorizing and was also concerned about the tests being duplicate. Can we please call pt back to advise. Thank You

## 2024-05-09 ENCOUNTER — TELEPHONE (OUTPATIENT)
Dept: CARDIOLOGY | Facility: CLINIC | Age: 39
End: 2024-05-09
Payer: COMMERCIAL

## 2024-05-29 LAB
OHS QRS DURATION: 92 MS
OHS QTC CALCULATION: 404 MS

## 2024-06-01 ENCOUNTER — HOSPITAL ENCOUNTER (EMERGENCY)
Facility: HOSPITAL | Age: 39
Discharge: HOME | End: 2024-06-01
Attending: EMERGENCY MEDICINE
Payer: COMMERCIAL

## 2024-06-01 VITALS
RESPIRATION RATE: 20 BRPM | SYSTOLIC BLOOD PRESSURE: 128 MMHG | HEART RATE: 98 BPM | DIASTOLIC BLOOD PRESSURE: 59 MMHG | BODY MASS INDEX: 32.28 KG/M2 | TEMPERATURE: 98 F | OXYGEN SATURATION: 96 % | WEIGHT: 225 LBS

## 2024-06-01 DIAGNOSIS — R11.2 NAUSEA VOMITING AND DIARRHEA: Primary | ICD-10-CM

## 2024-06-01 DIAGNOSIS — E80.6 HYPERBILIRUBINEMIA: ICD-10-CM

## 2024-06-01 DIAGNOSIS — R19.7 NAUSEA VOMITING AND DIARRHEA: Primary | ICD-10-CM

## 2024-06-01 DIAGNOSIS — E86.0 DEHYDRATION: ICD-10-CM

## 2024-06-01 LAB
ALBUMIN SERPL BCP-MCNC: 5.2 G/DL (ref 3.5–5.2)
ALP SERPL-CCNC: 48 U/L (ref 55–135)
ALT SERPL W/O P-5'-P-CCNC: 37 U/L (ref 10–44)
ANION GAP SERPL CALC-SCNC: 12 MMOL/L (ref 8–16)
AST SERPL-CCNC: 24 U/L (ref 10–40)
BASOPHILS # BLD AUTO: 0.03 K/UL (ref 0–0.2)
BASOPHILS NFR BLD: 0.3 % (ref 0–1.9)
BILIRUB SERPL-MCNC: 2.4 MG/DL (ref 0.1–1)
BILIRUB UR QL STRIP: NEGATIVE
BUN SERPL-MCNC: 24 MG/DL (ref 6–20)
CALCIUM SERPL-MCNC: 9.9 MG/DL (ref 8.7–10.5)
CHLORIDE SERPL-SCNC: 99 MMOL/L (ref 95–110)
CLARITY UR: CLEAR
CO2 SERPL-SCNC: 27 MMOL/L (ref 23–29)
COLOR UR: YELLOW
CREAT SERPL-MCNC: 1.2 MG/DL (ref 0.5–1.4)
DIFFERENTIAL METHOD BLD: ABNORMAL
EOSINOPHIL # BLD AUTO: 0 K/UL (ref 0–0.5)
EOSINOPHIL NFR BLD: 0 % (ref 0–8)
ERYTHROCYTE [DISTWIDTH] IN BLOOD BY AUTOMATED COUNT: 17.2 % (ref 11.5–14.5)
EST. GFR  (NO RACE VARIABLE): >60 ML/MIN/1.73 M^2
GLUCOSE SERPL-MCNC: 111 MG/DL (ref 70–110)
GLUCOSE UR QL STRIP: NEGATIVE
HCT VFR BLD AUTO: 49.1 % (ref 40–54)
HGB BLD-MCNC: 16.4 G/DL (ref 14–18)
HGB UR QL STRIP: NEGATIVE
IMM GRANULOCYTES # BLD AUTO: 0.07 K/UL (ref 0–0.04)
IMM GRANULOCYTES NFR BLD AUTO: 0.7 % (ref 0–0.5)
KETONES UR QL STRIP: NEGATIVE
LEUKOCYTE ESTERASE UR QL STRIP: NEGATIVE
LIPASE SERPL-CCNC: 24 U/L (ref 4–60)
LYMPHOCYTES # BLD AUTO: 0.3 K/UL (ref 1–4.8)
LYMPHOCYTES NFR BLD: 3.2 % (ref 18–48)
MCH RBC QN AUTO: 26.8 PG (ref 27–31)
MCHC RBC AUTO-ENTMCNC: 33.4 G/DL (ref 32–36)
MCV RBC AUTO: 80 FL (ref 82–98)
MONOCYTES # BLD AUTO: 0.5 K/UL (ref 0.3–1)
MONOCYTES NFR BLD: 5 % (ref 4–15)
NEUTROPHILS # BLD AUTO: 9.6 K/UL (ref 1.8–7.7)
NEUTROPHILS NFR BLD: 90.8 % (ref 38–73)
NITRITE UR QL STRIP: NEGATIVE
NRBC BLD-RTO: 0 /100 WBC
PH UR STRIP: 7 [PH] (ref 5–8)
PLATELET # BLD AUTO: 233 K/UL (ref 150–450)
PLATELET BLD QL SMEAR: ABNORMAL
PMV BLD AUTO: 11.9 FL (ref 9.2–12.9)
POIKILOCYTOSIS BLD QL SMEAR: SLIGHT
POLYCHROMASIA BLD QL SMEAR: ABNORMAL
POTASSIUM SERPL-SCNC: 3.9 MMOL/L (ref 3.5–5.1)
PROT SERPL-MCNC: 8.4 G/DL (ref 6–8.4)
PROT UR QL STRIP: NEGATIVE
RBC # BLD AUTO: 6.11 M/UL (ref 4.6–6.2)
SODIUM SERPL-SCNC: 138 MMOL/L (ref 136–145)
SP GR UR STRIP: 1.03 (ref 1–1.03)
STOMATOCYTES BLD QL SMEAR: PRESENT
URN SPEC COLLECT METH UR: NORMAL
UROBILINOGEN UR STRIP-ACNC: NEGATIVE EU/DL
WBC # BLD AUTO: 10.54 K/UL (ref 3.9–12.7)

## 2024-06-01 PROCEDURE — C9113 INJ PANTOPRAZOLE SODIUM, VIA: HCPCS | Performed by: EMERGENCY MEDICINE

## 2024-06-01 PROCEDURE — 80053 COMPREHEN METABOLIC PANEL: CPT | Performed by: EMERGENCY MEDICINE

## 2024-06-01 PROCEDURE — 99284 EMERGENCY DEPT VISIT MOD MDM: CPT | Mod: 25

## 2024-06-01 PROCEDURE — 96375 TX/PRO/DX INJ NEW DRUG ADDON: CPT

## 2024-06-01 PROCEDURE — 81003 URINALYSIS AUTO W/O SCOPE: CPT | Performed by: EMERGENCY MEDICINE

## 2024-06-01 PROCEDURE — 96374 THER/PROPH/DIAG INJ IV PUSH: CPT

## 2024-06-01 PROCEDURE — 25000003 PHARM REV CODE 250: Performed by: EMERGENCY MEDICINE

## 2024-06-01 PROCEDURE — 96361 HYDRATE IV INFUSION ADD-ON: CPT

## 2024-06-01 PROCEDURE — 83690 ASSAY OF LIPASE: CPT | Performed by: EMERGENCY MEDICINE

## 2024-06-01 PROCEDURE — 63600175 PHARM REV CODE 636 W HCPCS: Performed by: EMERGENCY MEDICINE

## 2024-06-01 PROCEDURE — 85025 COMPLETE CBC W/AUTO DIFF WBC: CPT | Performed by: EMERGENCY MEDICINE

## 2024-06-01 RX ORDER — HYOSCYAMINE SULFATE 0.5 MG/ML
0.25 INJECTION, SOLUTION SUBCUTANEOUS
Status: COMPLETED | OUTPATIENT
Start: 2024-06-01 | End: 2024-06-01

## 2024-06-01 RX ORDER — ONDANSETRON HYDROCHLORIDE 2 MG/ML
4 INJECTION, SOLUTION INTRAVENOUS
Status: COMPLETED | OUTPATIENT
Start: 2024-06-01 | End: 2024-06-01

## 2024-06-01 RX ORDER — ONDANSETRON 4 MG/1
4 TABLET, FILM COATED ORAL EVERY 6 HOURS PRN
Qty: 12 TABLET | Refills: 0 | Status: SHIPPED | OUTPATIENT
Start: 2024-06-01

## 2024-06-01 RX ORDER — LOPERAMIDE HYDROCHLORIDE 2 MG/1
4 CAPSULE ORAL
Status: COMPLETED | OUTPATIENT
Start: 2024-06-01 | End: 2024-06-01

## 2024-06-01 RX ORDER — PANTOPRAZOLE SODIUM 40 MG/10ML
40 INJECTION, POWDER, LYOPHILIZED, FOR SOLUTION INTRAVENOUS
Status: COMPLETED | OUTPATIENT
Start: 2024-06-01 | End: 2024-06-01

## 2024-06-01 RX ADMIN — HYOSCYAMINE SULFATE 0.25 MG: 0.5 INJECTION, SOLUTION SUBCUTANEOUS at 08:06

## 2024-06-01 RX ADMIN — SODIUM CHLORIDE, POTASSIUM CHLORIDE, SODIUM LACTATE AND CALCIUM CHLORIDE 1000 ML: 600; 310; 30; 20 INJECTION, SOLUTION INTRAVENOUS at 07:06

## 2024-06-01 RX ADMIN — SODIUM CHLORIDE, POTASSIUM CHLORIDE, SODIUM LACTATE AND CALCIUM CHLORIDE 1000 ML: 600; 310; 30; 20 INJECTION, SOLUTION INTRAVENOUS at 08:06

## 2024-06-01 RX ADMIN — ONDANSETRON 4 MG: 2 INJECTION INTRAMUSCULAR; INTRAVENOUS at 08:06

## 2024-06-01 RX ADMIN — PANTOPRAZOLE SODIUM 40 MG: 40 INJECTION, POWDER, LYOPHILIZED, FOR SOLUTION INTRAVENOUS at 08:06

## 2024-06-01 RX ADMIN — LOPERAMIDE HYDROCHLORIDE 4 MG: 2 CAPSULE ORAL at 08:06

## 2024-06-01 NOTE — ED PROVIDER NOTES
Encounter Date: 2024       History     Chief Complaint   Patient presents with    Vomiting     Started at 10pm yesterday    Diarrhea    Bloated     38-year-old no significant past medical problems.  Patient presents emergency department with complaint of nausea vomiting diarrhea, generalized abdominal cramping.  Patient states symptoms began yesterday in evening time.  He does admit to drinking beer and using marijuana prior to this happening.  Patient states that this has happened before in the past.  He denies fever, no melena, no hematochezia, no hematemesis.  No ill contacts.  Last episode of vomiting was at 4:00 a.m. this morning.      Review of patient's allergies indicates:  No Known Allergies  Past Medical History:   Diagnosis Date    Hyperlipidemia     Hypertension      Past Surgical History:   Procedure Laterality Date    WISDOM TOOTH EXTRACTION       Family History   Problem Relation Name Age of Onset    Stroke Mother Cammie Chin 55    Hypertension Mother Cammie Chin     Depression Mother Cammie Chin     Heart disease Maternal Grandfather Rian Hahn     No Known Problems Father      Breast cancer Sister Jacque Zafar 38    Cancer Sister Jacque Zafar     Breast cancer Paternal Grandmother       Social History     Tobacco Use    Smoking status: Former     Current packs/day: 0.00     Types: Cigarettes     Start date: 1996     Quit date: 2011     Years since quittin.3    Smokeless tobacco: Never   Substance Use Topics    Alcohol use: Yes     Alcohol/week: 2.0 standard drinks of alcohol     Types: 2 Cans of beer per week     Comment: social, <1 week    Drug use: Yes     Frequency: 7.0 times per week     Types: Marijuana     Review of Systems   Constitutional:  Negative for fever.   HENT:  Negative for sore throat.    Respiratory:  Negative for shortness of breath.    Cardiovascular:  Negative for chest pain.   Gastrointestinal:  Positive for diarrhea, nausea and  vomiting.   Genitourinary:  Negative for dysuria.   Musculoskeletal:  Negative for back pain.   Skin:  Negative for rash.   Neurological:  Negative for weakness.   Hematological:  Does not bruise/bleed easily.       Physical Exam     Initial Vitals [06/01/24 0419]   BP Pulse Resp Temp SpO2   (!) 136/94 (!) 120 20 98.3 °F (36.8 °C) 96 %      MAP       --         Physical Exam    Nursing note and vitals reviewed.  Constitutional: He appears well-developed and well-nourished.   HENT:   Head: Normocephalic and atraumatic.   Nose: Nose normal.   Mouth/Throat: Oropharynx is clear and moist.   Eyes: Conjunctivae and EOM are normal. Pupils are equal, round, and reactive to light. No scleral icterus.   Neck: Neck supple.   Normal range of motion.  Cardiovascular:  Normal rate, regular rhythm, normal heart sounds and intact distal pulses.     Exam reveals no gallop and no friction rub.       No murmur heard.  Pulmonary/Chest: Breath sounds normal. No stridor. No respiratory distress.   Abdominal: Abdomen is soft. Bowel sounds are normal. He exhibits no mass. There is no abdominal tenderness. There is no rebound and no guarding.   Musculoskeletal:         General: No edema. Normal range of motion.      Cervical back: Normal range of motion and neck supple.     Lymphadenopathy:     He has no cervical adenopathy.   Neurological: He is alert and oriented to person, place, and time. He has normal strength and normal reflexes. No cranial nerve deficit or sensory deficit. GCS score is 15. GCS eye subscore is 4. GCS verbal subscore is 5. GCS motor subscore is 6.   Skin: Skin is warm and dry. Capillary refill takes less than 2 seconds. No rash noted.   Psychiatric: He has a normal mood and affect. His behavior is normal. Judgment and thought content normal.         ED Course   Procedures  Labs Reviewed   CBC W/ AUTO DIFFERENTIAL   COMPREHENSIVE METABOLIC PANEL   LIPASE   URINALYSIS, REFLEX TO URINE CULTURE          Imaging Results     None          Medications   lactated ringers bolus 1,000 mL (1,000 mLs Intravenous New Bag 6/1/24 0801)   lactated ringers bolus 1,000 mL (0 mLs Intravenous Stopped 6/1/24 0802)   ondansetron injection 4 mg (4 mg Intravenous Given 6/1/24 0807)   pantoprazole injection 40 mg (40 mg Intravenous Given 6/1/24 0808)   hyoscyamine injection 0.25 mg (0.25 mg Intravenous Given 6/1/24 0808)   loperamide capsule 4 mg (4 mg Oral Given 6/1/24 0808)     Medical Decision Making  Amount and/or Complexity of Data Reviewed  Labs: ordered.  Radiology: ordered.    Risk  Prescription drug management.               ED Course as of 06/01/24 1219   Sat Jun 01, 2024   1217 Patient seen evaluated emergency department.  Currently at this time patient did have overall improvement in his symptoms after IV hydration and antiemetic therapy in emergency department.  Patient workup revealed T bili of 2.4 having no other evidence of transaminitis noted, with AST 24 ALT 37 alk-phos 48.  Patient white count found to be at 10,000. Urinalysis negative.  Patient was offered ultrasound right upper quadrant abdominal gallbladder however patient refused.  States that he did not want to incur a large pill.  Patient was given risks benefits of refusing and did choose to AMA against getting further testing.  Patient at this time was eligible for discharge.  Instructed to return if fever, worsening signs of symptoms of jaundice, abdominal pain, vomiting or additional concerns.  Patient also instructed to follow up with primary care provider next week. [RM]      ED Course User Index  [RM] Chino Friedman MD               Medical Decision Making:   Initial Assessment:   38-year-old no significant past medical problems.  Patient presents emergency department with complaint of nausea vomiting diarrhea, generalized abdominal cramping.  Patient states symptoms began yesterday in evening time.  He does admit to drinking beer and using marijuana prior to this  happening.  Patient states that this has happened before in the past.  He denies fever, no melena, no hematochezia, no hematemesis.  No ill contacts.  Last episode of vomiting was at 4:00 a.m. this morning.      Differential Diagnosis:   Gastroenteritis, viral syndrome, colitis, cyclic vomiting syndrome secondary to marijuana use  Clinical Tests:   Lab Tests: Ordered and Reviewed  Radiological Study: Ordered and Reviewed  Medical Tests: Ordered and Reviewed             Clinical Impression:  Final diagnoses:  [R11.2, R19.7] Nausea vomiting and diarrhea (Primary)                 Chino Friedman MD  06/01/24 0809       Chino Friedman MD  06/01/24 1144       Chino Friedman MD  06/01/24 1220

## 2024-06-03 ENCOUNTER — OFFICE VISIT (OUTPATIENT)
Dept: FAMILY MEDICINE | Facility: CLINIC | Age: 39
End: 2024-06-03
Payer: COMMERCIAL

## 2024-06-03 VITALS
DIASTOLIC BLOOD PRESSURE: 78 MMHG | SYSTOLIC BLOOD PRESSURE: 129 MMHG | OXYGEN SATURATION: 97 % | RESPIRATION RATE: 18 BRPM | HEART RATE: 71 BPM | WEIGHT: 231 LBS | HEIGHT: 70 IN | BODY MASS INDEX: 33.07 KG/M2

## 2024-06-03 DIAGNOSIS — N50.819 TESTICULAR DISCOMFORT: Primary | ICD-10-CM

## 2024-06-03 DIAGNOSIS — N52.9 ERECTILE DYSFUNCTION, UNSPECIFIED ERECTILE DYSFUNCTION TYPE: ICD-10-CM

## 2024-06-03 DIAGNOSIS — F41.9 ANXIETY: ICD-10-CM

## 2024-06-03 PROCEDURE — 1159F MED LIST DOCD IN RCRD: CPT | Mod: CPTII,S$GLB,, | Performed by: PHYSICIAN ASSISTANT

## 2024-06-03 PROCEDURE — 3074F SYST BP LT 130 MM HG: CPT | Mod: CPTII,S$GLB,, | Performed by: PHYSICIAN ASSISTANT

## 2024-06-03 PROCEDURE — 3078F DIAST BP <80 MM HG: CPT | Mod: CPTII,S$GLB,, | Performed by: PHYSICIAN ASSISTANT

## 2024-06-03 PROCEDURE — 99214 OFFICE O/P EST MOD 30 MIN: CPT | Mod: S$GLB,,, | Performed by: PHYSICIAN ASSISTANT

## 2024-06-03 PROCEDURE — 3008F BODY MASS INDEX DOCD: CPT | Mod: CPTII,S$GLB,, | Performed by: PHYSICIAN ASSISTANT

## 2024-06-03 RX ORDER — TADALAFIL 20 MG/1
20 TABLET ORAL DAILY PRN
Qty: 12 TABLET | Refills: 2 | Status: SHIPPED | OUTPATIENT
Start: 2024-06-03

## 2024-06-03 RX ORDER — ALPRAZOLAM 0.25 MG/1
0.25 TABLET ORAL 2 TIMES DAILY PRN
Qty: 30 TABLET | Refills: 0 | Status: SHIPPED | OUTPATIENT
Start: 2024-06-03 | End: 2024-07-03

## 2024-06-03 RX ORDER — ESCITALOPRAM OXALATE 10 MG/1
TABLET ORAL
Qty: 90 TABLET | Refills: 1 | Status: SHIPPED | OUTPATIENT
Start: 2024-06-03

## 2024-06-03 NOTE — PROGRESS NOTES
SUBJECTIVE:    Patient ID: Alejo Zafar is a 38 y.o. male.    Chief Complaint: Follow-up (No bottles// no refills needed// pt states he have no complaints today// last taken xanax was 3 days ago states he normally takes it once a month )    37 yo male presents today for regular checkup and refills. Reports that he is doing much better after dehydration episode and had fluids in the ER. Feeling well overall. Did had FBSE with derm. Reiterated fungal lesion and this has resolved now.     Follow-up  Pertinent negatives include no abdominal pain, arthralgias, chest pain, congestion, coughing, fatigue, fever or weakness.       Admission on 06/01/2024, Discharged on 06/01/2024   Component Date Value Ref Range Status    WBC 06/01/2024 10.54  3.90 - 12.70 K/uL Final    RBC 06/01/2024 6.11  4.60 - 6.20 M/uL Final    Hemoglobin 06/01/2024 16.4  14.0 - 18.0 g/dL Final    Hematocrit 06/01/2024 49.1  40.0 - 54.0 % Final    MCV 06/01/2024 80 (L)  82 - 98 fL Final    MCH 06/01/2024 26.8 (L)  27.0 - 31.0 pg Final    MCHC 06/01/2024 33.4  32.0 - 36.0 g/dL Final    RDW 06/01/2024 17.2 (H)  11.5 - 14.5 % Final    Platelets 06/01/2024 233  150 - 450 K/uL Final    MPV 06/01/2024 11.9  9.2 - 12.9 fL Final    Immature Granulocytes 06/01/2024 0.7 (H)  0.0 - 0.5 % Final    Gran # (ANC) 06/01/2024 9.6 (H)  1.8 - 7.7 K/uL Final    Immature Grans (Abs) 06/01/2024 0.07 (H)  0.00 - 0.04 K/uL Final    Lymph # 06/01/2024 0.3 (L)  1.0 - 4.8 K/uL Final    Mono # 06/01/2024 0.5  0.3 - 1.0 K/uL Final    Eos # 06/01/2024 0.0  0.0 - 0.5 K/uL Final    Baso # 06/01/2024 0.03  0.00 - 0.20 K/uL Final    nRBC 06/01/2024 0  0 /100 WBC Final    Gran % 06/01/2024 90.8 (H)  38.0 - 73.0 % Final    Lymph % 06/01/2024 3.2 (L)  18.0 - 48.0 % Final    Mono % 06/01/2024 5.0  4.0 - 15.0 % Final    Eosinophil % 06/01/2024 0.0  0.0 - 8.0 % Final    Basophil % 06/01/2024 0.3  0.0 - 1.9 % Final    Platelet Estimate 06/01/2024 Appears normal   Final    Poik 06/01/2024  Slight   Final    Poly 06/01/2024 Occasional   Final    Stomatocytes 06/01/2024 Present   Final    Differential Method 06/01/2024 Automated   Final    Sodium 06/01/2024 138  136 - 145 mmol/L Final    Potassium 06/01/2024 3.9  3.5 - 5.1 mmol/L Final    Chloride 06/01/2024 99  95 - 110 mmol/L Final    CO2 06/01/2024 27  23 - 29 mmol/L Final    Glucose 06/01/2024 111 (H)  70 - 110 mg/dL Final    BUN 06/01/2024 24 (H)  6 - 20 mg/dL Final    Creatinine 06/01/2024 1.2  0.5 - 1.4 mg/dL Final    Calcium 06/01/2024 9.9  8.7 - 10.5 mg/dL Final    Total Protein 06/01/2024 8.4  6.0 - 8.4 g/dL Final    Albumin 06/01/2024 5.2  3.5 - 5.2 g/dL Final    Total Bilirubin 06/01/2024 2.4 (H)  0.1 - 1.0 mg/dL Final    Alkaline Phosphatase 06/01/2024 48 (L)  55 - 135 U/L Final    AST 06/01/2024 24  10 - 40 U/L Final    ALT 06/01/2024 37  10 - 44 U/L Final    eGFR 06/01/2024 >60.0  >60 mL/min/1.73 m^2 Final    Anion Gap 06/01/2024 12  8 - 16 mmol/L Final    Lipase 06/01/2024 24  4 - 60 U/L Final    Specimen UA 06/01/2024 Urine, Clean Catch   Final    Color, UA 06/01/2024 Yellow  Yellow, Straw, Alejandra Final    Appearance, UA 06/01/2024 Clear  Clear Final    pH, UA 06/01/2024 7.0  5.0 - 8.0 Final    Specific Gravity, UA 06/01/2024 1.030  1.005 - 1.030 Final    Protein, UA 06/01/2024 Negative  Negative Final    Glucose, UA 06/01/2024 Negative  Negative Final    Ketones, UA 06/01/2024 Negative  Negative Final    Bilirubin (UA) 06/01/2024 Negative  Negative Final    Occult Blood UA 06/01/2024 Negative  Negative Final    Nitrite, UA 06/01/2024 Negative  Negative Final    Urobilinogen, UA 06/01/2024 Negative  Negative EU/dL Final    Leukocytes, UA 06/01/2024 Negative  Negative Final   Office Visit on 04/15/2024   Component Date Value Ref Range Status    QRS Duration 04/15/2024 92  ms Final    [facility] QTC Calculation 04/15/2024 404  ms Final       Past Medical History:   Diagnosis Date    Hyperlipidemia     Hypertension      Past Surgical History:    Procedure Laterality Date    WISDOM TOOTH EXTRACTION       Family History   Problem Relation Name Age of Onset    Stroke Mother Cammie Chin 55    Hypertension Mother Cammie Chin     Depression Mother Cammie Chin     Heart disease Maternal Grandfather Rian Hahn     No Known Problems Father      Breast cancer Sister Jacque Zafar 38    Cancer Sister Jacque Zafar     Breast cancer Paternal Grandmother         Marital Status:   Alcohol History:  reports current alcohol use of about 2.0 standard drinks of alcohol per week.  Tobacco History:  reports that he quit smoking about 13 years ago. His smoking use included cigarettes. He started smoking about 28 years ago. He has never used smokeless tobacco.  Drug History:  reports current drug use. Frequency: 7.00 times per week. Drug: Marijuana.    Review of patient's allergies indicates:  No Known Allergies    Current Outpatient Medications:     creatine monohydrate Powd, Take 6 g by mouth once daily., Disp: , Rfl:     omega-3 fatty acids-fish oil 340-1,000 mg Cap, Take 1 capsule by mouth., Disp: , Rfl:     ondansetron (ZOFRAN) 4 MG tablet, Take 1 tablet (4 mg total) by mouth every 6 (six) hours as needed for Nausea., Disp: 12 tablet, Rfl: 0    ALPRAZolam (XANAX) 0.25 MG tablet, Take 1 tablet (0.25 mg total) by mouth 2 (two) times daily as needed for Anxiety., Disp: 30 tablet, Rfl: 0    EScitalopram oxalate (LEXAPRO) 10 MG tablet, Take 1/2 tab (5mg) by mouth daily for first 2 weeks, then increase to 1 tab (10mg) daily, Disp: 90 tablet, Rfl: 1    tadalafiL (CIALIS) 20 MG Tab, Take 1 tablet (20 mg total) by mouth daily as needed (ED)., Disp: 12 tablet, Rfl: 2    Review of Systems   Constitutional:  Negative for activity change, fatigue, fever and unexpected weight change.   HENT:  Negative for congestion.    Respiratory:  Negative for apnea, cough, chest tightness and shortness of breath.    Cardiovascular:  Negative for chest pain and palpitations.  "  Gastrointestinal:  Negative for abdominal distention and abdominal pain.   Genitourinary:  Negative for difficulty urinating and dysuria.   Musculoskeletal:  Negative for arthralgias and back pain.   Neurological:  Negative for dizziness and weakness.          Objective:      Vitals:    06/03/24 1544   BP: 129/78   Pulse: 71   Resp: 18   SpO2: 97%   Weight: 104.8 kg (231 lb)   Height: 5' 10" (1.778 m)     Physical Exam  Constitutional:       General: He is not in acute distress.     Appearance: He is well-developed.   HENT:      Head: Normocephalic and atraumatic.   Eyes:      Pupils: Pupils are equal, round, and reactive to light.   Neck:      Thyroid: No thyromegaly.   Cardiovascular:      Rate and Rhythm: Normal rate and regular rhythm.      Heart sounds: Normal heart sounds.   Pulmonary:      Effort: Pulmonary effort is normal.      Breath sounds: Normal breath sounds.   Abdominal:      General: Bowel sounds are normal. There is no distension.      Palpations: Abdomen is soft.      Tenderness: There is no abdominal tenderness.      Hernia: There is no hernia in the left inguinal area or right inguinal area.   Genitourinary:     Penis: Circumcised.       Testes:         Right: Mass, testicular hydrocele or varicocele not present.         Left: Mass, testicular hydrocele or varicocele not present.   Musculoskeletal:         General: Normal range of motion.      Cervical back: Normal range of motion and neck supple.   Skin:     General: Skin is warm and dry.      Findings: No erythema or rash.   Neurological:      Mental Status: He is alert and oriented to person, place, and time.      Cranial Nerves: No cranial nerve deficit.           Assessment:       1. Testicular discomfort    2. Anxiety    3. Erectile dysfunction, unspecified erectile dysfunction type         Plan:       Testicular discomfort  Comments:  normal exam today. if sxs persist will consider imaging for further " evaluation.    Anxiety  Comments:  stable, continue as is with prn use of alprazolam.  Orders:  -     EScitalopram oxalate (LEXAPRO) 10 MG tablet; Take 1/2 tab (5mg) by mouth daily for first 2 weeks, then increase to 1 tab (10mg) daily  Dispense: 90 tablet; Refill: 1  -     ALPRAZolam (XANAX) 0.25 MG tablet; Take 1 tablet (0.25 mg total) by mouth 2 (two) times daily as needed for Anxiety.  Dispense: 30 tablet; Refill: 0    Erectile dysfunction, unspecified erectile dysfunction type  Comments:  refills as needed.  Orders:  -     tadalafiL (CIALIS) 20 MG Tab; Take 1 tablet (20 mg total) by mouth daily as needed (ED).  Dispense: 12 tablet; Refill: 2      Follow up in about 6 months (around 12/3/2024).        6/3/2024 Freddy Arana PA-C

## 2024-08-06 ENCOUNTER — OFFICE VISIT (OUTPATIENT)
Dept: FAMILY MEDICINE | Facility: CLINIC | Age: 39
End: 2024-08-06
Payer: COMMERCIAL

## 2024-08-06 VITALS
OXYGEN SATURATION: 97 % | RESPIRATION RATE: 18 BRPM | HEIGHT: 70 IN | HEART RATE: 74 BPM | SYSTOLIC BLOOD PRESSURE: 112 MMHG | WEIGHT: 235 LBS | BODY MASS INDEX: 33.64 KG/M2 | DIASTOLIC BLOOD PRESSURE: 76 MMHG

## 2024-08-06 DIAGNOSIS — F41.9 ANXIETY: ICD-10-CM

## 2024-08-06 DIAGNOSIS — F51.01 PRIMARY INSOMNIA: Primary | ICD-10-CM

## 2024-08-06 PROCEDURE — 3074F SYST BP LT 130 MM HG: CPT | Mod: CPTII,,, | Performed by: PHYSICIAN ASSISTANT

## 2024-08-06 PROCEDURE — 99214 OFFICE O/P EST MOD 30 MIN: CPT | Mod: ,,, | Performed by: PHYSICIAN ASSISTANT

## 2024-08-06 PROCEDURE — 1159F MED LIST DOCD IN RCRD: CPT | Mod: CPTII,,, | Performed by: PHYSICIAN ASSISTANT

## 2024-08-06 PROCEDURE — 3008F BODY MASS INDEX DOCD: CPT | Mod: CPTII,,, | Performed by: PHYSICIAN ASSISTANT

## 2024-08-06 PROCEDURE — 3078F DIAST BP <80 MM HG: CPT | Mod: CPTII,,, | Performed by: PHYSICIAN ASSISTANT

## 2024-08-06 RX ORDER — ESCITALOPRAM OXALATE 20 MG/1
20 TABLET ORAL DAILY
Qty: 90 TABLET | Refills: 1 | Status: SHIPPED | OUTPATIENT
Start: 2024-08-06 | End: 2025-02-02

## 2024-08-06 RX ORDER — DARIDOREXANT 50 MG/1
50 TABLET, FILM COATED ORAL NIGHTLY
Qty: 30 TABLET | Refills: 2 | Status: SHIPPED | OUTPATIENT
Start: 2024-08-06 | End: 2024-11-04

## 2024-08-07 ENCOUNTER — PATIENT MESSAGE (OUTPATIENT)
Dept: FAMILY MEDICINE | Facility: CLINIC | Age: 39
End: 2024-08-07
Payer: COMMERCIAL

## 2024-08-09 RX ORDER — ESZOPICLONE 2 MG/1
2 TABLET, FILM COATED ORAL NIGHTLY
Qty: 30 TABLET | Refills: 0 | Status: SHIPPED | OUTPATIENT
Start: 2024-08-09 | End: 2024-09-08

## 2024-09-19 ENCOUNTER — PATIENT MESSAGE (OUTPATIENT)
Dept: DERMATOLOGY | Facility: CLINIC | Age: 39
End: 2024-09-19
Payer: COMMERCIAL

## 2024-10-29 ENCOUNTER — PATIENT MESSAGE (OUTPATIENT)
Dept: FAMILY MEDICINE | Facility: CLINIC | Age: 39
End: 2024-10-29
Payer: COMMERCIAL

## 2024-10-29 DIAGNOSIS — B37.49 CANDIDA INFECTION OF GENITAL REGION: Primary | ICD-10-CM

## 2024-10-29 RX ORDER — CLOTRIMAZOLE AND BETAMETHASONE DIPROPIONATE 10; .64 MG/G; MG/G
CREAM TOPICAL 2 TIMES DAILY
Qty: 45 G | Refills: 0 | Status: SHIPPED | OUTPATIENT
Start: 2024-10-29

## 2024-11-21 DIAGNOSIS — F41.9 ANXIETY: ICD-10-CM

## 2024-11-21 RX ORDER — ALPRAZOLAM 0.25 MG/1
0.25 TABLET ORAL 2 TIMES DAILY PRN
Qty: 30 TABLET | Refills: 0 | Status: SHIPPED | OUTPATIENT
Start: 2024-11-21 | End: 2024-12-21

## 2024-12-27 ENCOUNTER — OFFICE VISIT (OUTPATIENT)
Dept: FAMILY MEDICINE | Facility: CLINIC | Age: 39
End: 2024-12-27
Payer: COMMERCIAL

## 2024-12-27 VITALS
DIASTOLIC BLOOD PRESSURE: 80 MMHG | HEART RATE: 81 BPM | OXYGEN SATURATION: 96 % | WEIGHT: 246.63 LBS | BODY MASS INDEX: 35.31 KG/M2 | HEIGHT: 70 IN | SYSTOLIC BLOOD PRESSURE: 124 MMHG

## 2024-12-27 DIAGNOSIS — K21.9 GASTROESOPHAGEAL REFLUX DISEASE, UNSPECIFIED WHETHER ESOPHAGITIS PRESENT: ICD-10-CM

## 2024-12-27 DIAGNOSIS — F41.9 ANXIETY: ICD-10-CM

## 2024-12-27 DIAGNOSIS — Z79.899 ENCOUNTER FOR LONG-TERM (CURRENT) USE OF MEDICATIONS: ICD-10-CM

## 2024-12-27 DIAGNOSIS — F41.9 ANXIETY: Primary | ICD-10-CM

## 2024-12-27 RX ORDER — PANTOPRAZOLE SODIUM 40 MG/1
40 TABLET, DELAYED RELEASE ORAL DAILY
Qty: 90 TABLET | Refills: 3 | Status: SHIPPED | OUTPATIENT
Start: 2024-12-27 | End: 2025-12-27

## 2024-12-27 RX ORDER — ALPRAZOLAM 0.25 MG/1
0.25 TABLET ORAL 2 TIMES DAILY PRN
Qty: 30 TABLET | Refills: 0 | Status: SHIPPED | OUTPATIENT
Start: 2024-12-27 | End: 2025-01-26

## 2024-12-27 NOTE — PROGRESS NOTES
SUBJECTIVE:    Patient ID: Alejo Zafar is a 39 y.o. male.    Chief Complaint: Follow-up (No bottles//Pt is here for a 6 month follow up//KE)    History of Present Illness    CHIEF COMPLAINT:  Alejo presents today for a regular follow-up.    GERD:  He has experienced severe acid reflux for the past year, occurring regardless of food intake. He must stop eating 4-5 hours before bed, though reflux sometimes still causes nighttime awakening with coughing and throat irritation. He has been sleeping at an angle for several months to manage symptoms.    GI CONCERNS:  He reports increased frequency of bowel movements, which appear to correlate with alcohol consumption.    SOCIAL HISTORY:  He reports increased food and alcohol consumption during the holiday season.    VITAL SIGNS:  He reports home SpO2 of 96% and expresses concern about this value being low.    LABS:  MCV and muscular hemoglobin are slightly low, suggesting possible iron deficiency.    MEDICATIONS:  He takes Lexapro daily at 7:00 AM.    FAMILY HISTORY:  No family history of colon cancer.      ROS:  General: -fever, -chills, -fatigue, -weight gain, -weight loss  Eyes: -vision changes, -redness, -discharge  ENT: -ear pain, -nasal congestion, -sore throat  Cardiovascular: -chest pain, -palpitations, -lower extremity edema  Respiratory: +cough, -shortness of breath  Gastrointestinal: -abdominal pain, -nausea, -vomiting, -diarrhea, -constipation, -blood in stool, +change in bowel habits  Genitourinary: -dysuria, -hematuria, -frequency  Musculoskeletal: -joint pain, -muscle pain  Skin: -rash, -lesion  Neurological: -headache, -dizziness, -numbness, -tingling  Psychiatric: -anxiety, -depression, -sleep difficulty         No visits with results within 6 Month(s) from this visit.   Latest known visit with results is:   Admission on 06/01/2024, Discharged on 06/01/2024   Component Date Value Ref Range Status    WBC 06/01/2024 10.54  3.90 - 12.70 K/uL Final     RBC 06/01/2024 6.11  4.60 - 6.20 M/uL Final    Hemoglobin 06/01/2024 16.4  14.0 - 18.0 g/dL Final    Hematocrit 06/01/2024 49.1  40.0 - 54.0 % Final    MCV 06/01/2024 80 (L)  82 - 98 fL Final    MCH 06/01/2024 26.8 (L)  27.0 - 31.0 pg Final    MCHC 06/01/2024 33.4  32.0 - 36.0 g/dL Final    RDW 06/01/2024 17.2 (H)  11.5 - 14.5 % Final    Platelets 06/01/2024 233  150 - 450 K/uL Final    MPV 06/01/2024 11.9  9.2 - 12.9 fL Final    Immature Granulocytes 06/01/2024 0.7 (H)  0.0 - 0.5 % Final    Gran # (ANC) 06/01/2024 9.6 (H)  1.8 - 7.7 K/uL Final    Immature Grans (Abs) 06/01/2024 0.07 (H)  0.00 - 0.04 K/uL Final    Lymph # 06/01/2024 0.3 (L)  1.0 - 4.8 K/uL Final    Mono # 06/01/2024 0.5  0.3 - 1.0 K/uL Final    Eos # 06/01/2024 0.0  0.0 - 0.5 K/uL Final    Baso # 06/01/2024 0.03  0.00 - 0.20 K/uL Final    nRBC 06/01/2024 0  0 /100 WBC Final    Gran % 06/01/2024 90.8 (H)  38.0 - 73.0 % Final    Lymph % 06/01/2024 3.2 (L)  18.0 - 48.0 % Final    Mono % 06/01/2024 5.0  4.0 - 15.0 % Final    Eosinophil % 06/01/2024 0.0  0.0 - 8.0 % Final    Basophil % 06/01/2024 0.3  0.0 - 1.9 % Final    Platelet Estimate 06/01/2024 Appears normal   Final    Poik 06/01/2024 Slight   Final    Poly 06/01/2024 Occasional   Final    Stomatocytes 06/01/2024 Present   Final    Differential Method 06/01/2024 Automated   Final    Sodium 06/01/2024 138  136 - 145 mmol/L Final    Potassium 06/01/2024 3.9  3.5 - 5.1 mmol/L Final    Chloride 06/01/2024 99  95 - 110 mmol/L Final    CO2 06/01/2024 27  23 - 29 mmol/L Final    Glucose 06/01/2024 111 (H)  70 - 110 mg/dL Final    BUN 06/01/2024 24 (H)  6 - 20 mg/dL Final    Creatinine 06/01/2024 1.2  0.5 - 1.4 mg/dL Final    Calcium 06/01/2024 9.9  8.7 - 10.5 mg/dL Final    Total Protein 06/01/2024 8.4  6.0 - 8.4 g/dL Final    Albumin 06/01/2024 5.2  3.5 - 5.2 g/dL Final    Total Bilirubin 06/01/2024 2.4 (H)  0.1 - 1.0 mg/dL Final    Alkaline Phosphatase 06/01/2024 48 (L)  55 - 135 U/L Final    AST  06/01/2024 24  10 - 40 U/L Final    ALT 06/01/2024 37  10 - 44 U/L Final    eGFR 06/01/2024 >60.0  >60 mL/min/1.73 m^2 Final    Anion Gap 06/01/2024 12  8 - 16 mmol/L Final    Lipase 06/01/2024 24  4 - 60 U/L Final    Specimen UA 06/01/2024 Urine, Clean Catch   Final    Color, UA 06/01/2024 Yellow  Yellow, Straw, Alejandra Final    Appearance, UA 06/01/2024 Clear  Clear Final    pH, UA 06/01/2024 7.0  5.0 - 8.0 Final    Specific Gravity, UA 06/01/2024 1.030  1.005 - 1.030 Final    Protein, UA 06/01/2024 Negative  Negative Final    Glucose, UA 06/01/2024 Negative  Negative Final    Ketones, UA 06/01/2024 Negative  Negative Final    Bilirubin (UA) 06/01/2024 Negative  Negative Final    Occult Blood UA 06/01/2024 Negative  Negative Final    Nitrite, UA 06/01/2024 Negative  Negative Final    Urobilinogen, UA 06/01/2024 Negative  Negative EU/dL Final    Leukocytes, UA 06/01/2024 Negative  Negative Final       Past Medical History:   Diagnosis Date    Hyperlipidemia     Hypertension      Past Surgical History:   Procedure Laterality Date    WISDOM TOOTH EXTRACTION       Family History   Problem Relation Name Age of Onset    Stroke Mother Cammie Chin 55    Hypertension Mother Cammie Chin     Depression Mother Cammie Chin     Heart disease Maternal Grandfather Rian Hahn     No Known Problems Father      Breast cancer Sister Jacque Zafar 38    Cancer Sister Jacque Zafar     Breast cancer Paternal Grandmother         Marital Status:   Alcohol History:  reports current alcohol use of about 2.0 standard drinks of alcohol per week.  Tobacco History:  reports that he quit smoking about 13 years ago. His smoking use included cigarettes. He started smoking about 28 years ago. He has never used smokeless tobacco.  Drug History:  reports current drug use. Frequency: 7.00 times per week. Drug: Marijuana.    Review of patient's allergies indicates:  No Known Allergies    Current Outpatient Medications:     ALPRAZolam (XANAX)  "0.25 MG tablet, Take 1 tablet (0.25 mg total) by mouth 2 (two) times daily as needed for Anxiety., Disp: 30 tablet, Rfl: 0    clotrimazole-betamethasone 1-0.05% (LOTRISONE) cream, Apply topically 2 (two) times daily., Disp: 45 g, Rfl: 0    creatine monohydrate Powd, Take 6 g by mouth once daily., Disp: , Rfl:     EScitalopram oxalate (LEXAPRO) 20 MG tablet, Take 1 tablet (20 mg total) by mouth once daily., Disp: 90 tablet, Rfl: 1    omega-3 fatty acids-fish oil 340-1,000 mg Cap, Take 1 capsule by mouth., Disp: , Rfl:     pantoprazole (PROTONIX) 40 MG tablet, Take 1 tablet (40 mg total) by mouth once daily., Disp: 90 tablet, Rfl: 3    tadalafiL (CIALIS) 20 MG Tab, Take 1 tablet (20 mg total) by mouth daily as needed (ED)., Disp: 12 tablet, Rfl: 2    Objective:      Vitals:    12/27/24 0831   BP: 124/80   Pulse: 81   SpO2: 96%   Weight: 111.9 kg (246 lb 9.6 oz)   Height: 5' 10" (1.778 m)     Physical Exam    Vitals: Blood pressure: 124/80. SpO2: 96%.  General: No acute distress. Well-developed. Well-nourished.  Eyes: EOMI. Sclerae anicteric.  HENT: Normocephalic. Atraumatic. Nares patent. Moist oral mucosa.  Ears: Bilateral TMs clear. Bilateral EACs clear.  Cardiovascular: Regular rate. Regular rhythm. No murmurs. No rubs. No gallops. Normal S1, S2.  Respiratory: Normal respiratory effort. Clear to auscultation bilaterally. No rales. No rhonchi. No wheezing.  Abdomen: Soft. Non-tender. Non-distended. Normoactive bowel sounds.  Musculoskeletal: No  obvious deformity.  Extremities: No lower extremity edema.  Neurological: Alert & oriented x3. No slurred speech. Normal gait.  Psychiatric: Normal mood. Normal affect. Good insight. Good judgment.  Skin: Warm. Dry. No rash.         Assessment:       Assessment & Plan    - Assessed SpO2 of 96%, within normal range  - Evaluated low MCV and MCH, suggesting possible mild iron deficiency  - Initiated PPI therapy for acid reflux symptoms refractory to lifestyle modifications  - " Considered referral for EGD if reflux persists despite PPI therapy  - Reviewed bowel habits, no concerning changes noted    GASTRO-ESOPHAGEAL REFLUX DISEASE (GERD):  - Educated on common triggers for acid reflux, including acidic foods, tomato-based products, alcohol, and caffeine.  - Explained potential risks of untreated severe reflux, including erosive esophagitis and increased risk of esophageal cancer.  - Recommend avoiding eating 4-5 hours before bedtime to manage reflux symptoms.  - Alejo to maintain elevation of head while sleeping to alleviate reflux symptoms.  - Started Pantoprazole (Protonix) 1 tablet daily, to be taken 15-20 minutes before breakfast with water.  - Referred to gastroenterologist for potential EGD if reflux symptoms persist despite PPI therapy.  - Contact the office if no improvement in reflux symptoms after a few weeks on Pantoprazole.    IRON DEFICIENCY ANEMIA:  - Discussed iron's role in oxygen transport within RBCs.  - Alejo to consider increasing intake of iron-rich foods (e.g., red meat, spinach).  - Recommend OTC iron supplement 325mg every other day.    MAJOR DEPRESSIVE DISORDER:  - Continued Lexapro.    GENERAL HEALTH INFORMATION:  - Explained normal range for SpO2 (95% and above).  - Informed about average bowel movement frequency (2 daily) and signs of concern (blood in stool, change in caliber).    FOLLOW-UP:  - Follow up in February for annual appointment.  - Message through patient portal for any issues with prescribed medications.       Plan:       Anxiety  -     DRUG MONITOR, PANEL 4, W/CONF, URINE  -     ALPRAZolam (XANAX) 0.25 MG tablet; Take 1 tablet (0.25 mg total) by mouth 2 (two) times daily as needed for Anxiety.  Dispense: 30 tablet; Refill: 0    Encounter for long-term (current) use of medications  -     DRUG MONITOR, PANEL 4, W/CONF, URINE    Anxiety  Comments:  stable, continue as is with prn use of alprazolam.  Orders:  -     DRUG MONITOR, PANEL 4, W/CONF,  URINE  -     ALPRAZolam (XANAX) 0.25 MG tablet; Take 1 tablet (0.25 mg total) by mouth 2 (two) times daily as needed for Anxiety.  Dispense: 30 tablet; Refill: 0    Gastroesophageal reflux disease, unspecified whether esophagitis present  -     pantoprazole (PROTONIX) 40 MG tablet; Take 1 tablet (40 mg total) by mouth once daily.  Dispense: 90 tablet; Refill: 3      Follow up for as scheduled.    This note was generated with the assistance of ambient listening technology. Verbal consent was obtained by the patient and accompanying visitor(s) for the recording of patient appointment to facilitate this note. I attest to having reviewed and edited the generated note for accuracy, though some syntax or spelling errors may persist. Please contact the author of this note for any clarification.          12/27/2024 Freddy Arana PA-C

## 2025-02-05 ENCOUNTER — PATIENT MESSAGE (OUTPATIENT)
Dept: FAMILY MEDICINE | Facility: CLINIC | Age: 40
End: 2025-02-05
Payer: COMMERCIAL

## 2025-02-15 ENCOUNTER — PATIENT MESSAGE (OUTPATIENT)
Dept: FAMILY MEDICINE | Facility: CLINIC | Age: 40
End: 2025-02-15
Payer: COMMERCIAL

## 2025-02-15 DIAGNOSIS — G47.30 SLEEP APNEA, UNSPECIFIED TYPE: ICD-10-CM

## 2025-02-15 DIAGNOSIS — F51.01 PRIMARY INSOMNIA: Primary | ICD-10-CM

## 2025-02-19 DIAGNOSIS — F41.9 ANXIETY: ICD-10-CM

## 2025-02-19 RX ORDER — ESCITALOPRAM OXALATE 20 MG/1
20 TABLET ORAL DAILY
Qty: 90 TABLET | Refills: 1 | Status: SHIPPED | OUTPATIENT
Start: 2025-02-19 | End: 2025-08-18

## 2025-03-03 ENCOUNTER — TELEPHONE (OUTPATIENT)
Dept: FAMILY MEDICINE | Facility: CLINIC | Age: 40
End: 2025-03-03
Payer: COMMERCIAL

## 2025-03-03 DIAGNOSIS — Z79.899 ENCOUNTER FOR LONG-TERM (CURRENT) USE OF MEDICATIONS: ICD-10-CM

## 2025-03-03 DIAGNOSIS — E78.00 ELEVATED CHOLESTEROL: ICD-10-CM

## 2025-03-03 DIAGNOSIS — Z00.00 ANNUAL PHYSICAL EXAM: Primary | ICD-10-CM

## 2025-03-03 DIAGNOSIS — F51.01 PRIMARY INSOMNIA: ICD-10-CM

## 2025-03-17 ENCOUNTER — OFFICE VISIT (OUTPATIENT)
Dept: PULMONOLOGY | Facility: CLINIC | Age: 40
End: 2025-03-17
Payer: COMMERCIAL

## 2025-03-17 VITALS
HEART RATE: 88 BPM | SYSTOLIC BLOOD PRESSURE: 126 MMHG | DIASTOLIC BLOOD PRESSURE: 80 MMHG | BODY MASS INDEX: 34.79 KG/M2 | WEIGHT: 243 LBS | HEIGHT: 70 IN | OXYGEN SATURATION: 97 %

## 2025-03-17 DIAGNOSIS — F51.01 PRIMARY INSOMNIA: ICD-10-CM

## 2025-03-17 DIAGNOSIS — F51.12 INSUFFICIENT SLEEP SYNDROME: ICD-10-CM

## 2025-03-17 DIAGNOSIS — R35.1 NOCTURIA: Primary | ICD-10-CM

## 2025-03-17 DIAGNOSIS — R41.89 BRAIN FOG: ICD-10-CM

## 2025-03-17 DIAGNOSIS — R06.83 SNORES: ICD-10-CM

## 2025-03-17 PROCEDURE — 3079F DIAST BP 80-89 MM HG: CPT | Mod: CPTII,S$GLB,, | Performed by: NURSE PRACTITIONER

## 2025-03-17 PROCEDURE — 99999 PR PBB SHADOW E&M-EST. PATIENT-LVL III: CPT | Mod: PBBFAC,,, | Performed by: NURSE PRACTITIONER

## 2025-03-17 PROCEDURE — 3008F BODY MASS INDEX DOCD: CPT | Mod: CPTII,S$GLB,, | Performed by: NURSE PRACTITIONER

## 2025-03-17 PROCEDURE — 3074F SYST BP LT 130 MM HG: CPT | Mod: CPTII,S$GLB,, | Performed by: NURSE PRACTITIONER

## 2025-03-17 PROCEDURE — 1159F MED LIST DOCD IN RCRD: CPT | Mod: CPTII,S$GLB,, | Performed by: NURSE PRACTITIONER

## 2025-03-17 PROCEDURE — 99214 OFFICE O/P EST MOD 30 MIN: CPT | Mod: S$GLB,,, | Performed by: NURSE PRACTITIONER

## 2025-03-17 NOTE — PATIENT INSTRUCTIONS
Will proceed with in lab sleep study to evaluate for WILLARD.   Try to keep a log on mornings when you wake up of how many hours slept, how you feel, and what you did night before.   Follow up in about 2 months (around 5/17/2025).

## 2025-03-17 NOTE — PROGRESS NOTES
SUBJECTIVE:    Patient ID: Alejo Zafar is a 39 y.o. male.    Chief Complaint: Establish Care and Sleep Apnea (Had HST done 2022)    HPI    Patient here today to be evaluated for WILLARD, and Insomnia.  He states he has no difficulty falling asleep.  He wakes up in the morning and feels he does not have good quality sleep.  The Patient has tried Ambien, Lunesta, Belsomra, Trazodone.  He had a non diagnostic home sleep study 3 years ago.  He does snore, wakes up 2-3 times a night to urinate, has brain fog and forgetfulness. He goes to bed at 9pm, wakes up at 6am. He takes Lexapro in the morning.    Past Medical History:   Diagnosis Date    Hyperlipidemia     Hypertension      Past Surgical History:   Procedure Laterality Date    WISDOM TOOTH EXTRACTION       Family History   Problem Relation Name Age of Onset    Stroke Mother Cammie Chin 55    Hypertension Mother Cammie Chin     Depression Mother Cammie Chin     Heart disease Maternal Grandfather Rian Hahn     No Known Problems Father      Breast cancer Sister Jacque Zafar 38    Cancer Sister Jacque Zafar     Breast cancer Paternal Grandmother          Social History:   Marital Status:   Occupation: IT  Alcohol History:  reports current alcohol use of about 2.0 standard drinks of alcohol per week.  Tobacco History:  reports that he quit smoking about 14 years ago. His smoking use included cigarettes. He started smoking about 29 years ago. He has never used smokeless tobacco.  Drug History:  reports current drug use. Frequency: 7.00 times per week. Drug: Marijuana.    Review of patient's allergies indicates:  No Known Allergies    Current Medications[1]    Review of Systems  General: Feeling Well. Snores, wakes up not feeling refreshed  Eyes: Vision is good.  ENT:  No sinusitis or pharyngitis.   Heart:: palpitations.  Lungs: No cough, sputum, or wheezing.  GI: No Nausea, vomiting, constipation, diarrhea, or reflux.  : wakes up several times a night to  "urinate 2-3 times   Musculoskeletal: No joint pain or myalgias.  Skin: No lesions or rashes.  Neuro: brain fog every morning, forgetfulness   Lymph: No edema or adenopathy.  Psych: anxiety   Endo: No weight change.    OBJECTIVE:      /80 (BP Location: Right arm, Patient Position: Sitting)   Pulse 88   Ht 5' 10" (1.778 m)   Wt 110.2 kg (243 lb)   SpO2 97%   BMI 34.87 kg/m²     Physical Exam  GENERAL: middle aged  patient in no distress.  HEENT: Pupils equal and reactive. Extraocular movements intact. Nose intact.      Pharynx moist.malampatti 3   NECK: Supple. 16 inches   HEART: Regular rate and rhythm. No murmur or gallop auscultated.  LUNGS: Clear to auscultation and percussion. Lung excursion symmetrical. No change in fremitus. No adventitial noises.  ABDOMEN: Bowel sounds present. Non-tender, no masses palpated.  EXTREMITIES: Normal muscle tone and joint movement, no cyanosis or clubbing.   LYMPHATICS: No adenopathy palpated, no edema.  SKIN: Dry, intact, no lesions.   NEURO: Cranial nerves II-XII intact. Motor strength 5/5 bilaterally, upper and lower extremities.  PSYCH: Appropriate affect.    Assessment:       1. Nocturia    2. Primary insomnia    3. Brain fog    4. Insufficient sleep syndrome    5. Snores        [place] scale 0  Plan:          Will proceed with in lab sleep study to evaluate for WILLARD.   Try to keep a log on mornings when you wake up of how many hours slept, how you feel, and what you did night before.   Try taking the Lexapro at night   Follow up in about 2 months (around 5/17/2025).               [1]   Current Outpatient Medications   Medication Sig Dispense Refill    ALPRAZolam (XANAX) 0.25 MG tablet Take 1 tablet (0.25 mg total) by mouth 2 (two) times daily as needed for Anxiety. 30 tablet 0    creatine monohydrate Powd Take 6 g by mouth once daily.      EScitalopram oxalate (LEXAPRO) 20 MG tablet Take 1 tablet (20 mg total) by mouth once daily. 90 tablet 1    omega-3 fatty " acids-fish oil 340-1,000 mg Cap Take 1 capsule by mouth.      pantoprazole (PROTONIX) 40 MG tablet Take 1 tablet (40 mg total) by mouth once daily. 90 tablet 3    tadalafiL (CIALIS) 20 MG Tab Take 1 tablet (20 mg total) by mouth daily as needed (ED). 12 tablet 2    clotrimazole-betamethasone 1-0.05% (LOTRISONE) cream Apply topically 2 (two) times daily. (Patient not taking: Reported on 3/17/2025) 45 g 0     No current facility-administered medications for this visit.

## 2025-04-08 ENCOUNTER — OFFICE VISIT (OUTPATIENT)
Dept: FAMILY MEDICINE | Facility: CLINIC | Age: 40
End: 2025-04-08
Payer: COMMERCIAL

## 2025-04-08 VITALS
HEIGHT: 70 IN | SYSTOLIC BLOOD PRESSURE: 124 MMHG | RESPIRATION RATE: 18 BRPM | OXYGEN SATURATION: 97 % | BODY MASS INDEX: 34.5 KG/M2 | DIASTOLIC BLOOD PRESSURE: 80 MMHG | HEART RATE: 84 BPM | WEIGHT: 241 LBS

## 2025-04-08 DIAGNOSIS — R00.2 PALPITATIONS: Primary | ICD-10-CM

## 2025-04-08 NOTE — PROGRESS NOTES
"  SUBJECTIVE:    Patient ID: Alejo Zafar is a 39 y.o. male.    Chief Complaint: Annual Exam (Annual visit//no bottles// no refills needed//pt states he have no complaints today //last taken xanax was last night )    History of Present Illness    CHIEF COMPLAINT:  Alejo presents today for follow up.    SLEEP:  He reports not feeling refreshed upon waking despite adequate sleep at night. He denies daytime fatigue, napping, or decreased alertness. Previous in-home sleep study was non-diagnostic for sleep apnea.    CARDIAC:  He experiences palpitations described as "flutters" when lying on his left side, occurring moments after position change. He denies associated shooting pains. Previous cardiac workup included Holter monitor and stress test performed a couple years ago.    GERD:  He reports good response to as-needed Protonix for acid reflux. Late night eating and beer consumption are identified as primary triggers.    DERMATOLOGIC:  He reports intermittent rash development following alcohol consumption. He also notes recent increase in scratches and red bumps on arms, potentially associated with dog sleeping in bed. Last full body skin exam was in August 2023.    MEDICATIONS:  He continues morning Lexapro and reports good compliance with morning dosing schedule. He takes Xanax as needed, noting effectiveness when taken at night, though expresses hesitancy about regular nightly use due to concerns about dependence.      ROS:  General: -fever, -chills, -fatigue, -weight gain, -weight loss, +sleep disturbances  Eyes: -vision changes, -redness, -discharge  ENT: -ear pain, -nasal congestion, -sore throat  Cardiovascular: -chest pain, +palpitations, -lower extremity edema, +feeling of flutter in chest  Respiratory: -cough, -shortness of breath  Gastrointestinal: -abdominal pain, -nausea, -vomiting, -diarrhea, -constipation, -blood in stool, +indigestion  Genitourinary: -dysuria, -hematuria, " -frequency  Musculoskeletal: -joint pain, -muscle pain  Skin: +rash, +lesion, +skin redness  Neurological: -headache, +dizziness, -numbness, -tingling  Psychiatric: -anxiety, -depression, -sleep difficulty         No visits with results within 6 Month(s) from this visit.   Latest known visit with results is:   Admission on 06/01/2024, Discharged on 06/01/2024   Component Date Value Ref Range Status    WBC 06/01/2024 10.54  3.90 - 12.70 K/uL Final    RBC 06/01/2024 6.11  4.60 - 6.20 M/uL Final    Hemoglobin 06/01/2024 16.4  14.0 - 18.0 g/dL Final    Hematocrit 06/01/2024 49.1  40.0 - 54.0 % Final    MCV 06/01/2024 80 (L)  82 - 98 fL Final    MCH 06/01/2024 26.8 (L)  27.0 - 31.0 pg Final    MCHC 06/01/2024 33.4  32.0 - 36.0 g/dL Final    RDW 06/01/2024 17.2 (H)  11.5 - 14.5 % Final    Platelets 06/01/2024 233  150 - 450 K/uL Final    MPV 06/01/2024 11.9  9.2 - 12.9 fL Final    Immature Granulocytes 06/01/2024 0.7 (H)  0.0 - 0.5 % Final    Gran # (ANC) 06/01/2024 9.6 (H)  1.8 - 7.7 K/uL Final    Immature Grans (Abs) 06/01/2024 0.07 (H)  0.00 - 0.04 K/uL Final    Lymph # 06/01/2024 0.3 (L)  1.0 - 4.8 K/uL Final    Mono # 06/01/2024 0.5  0.3 - 1.0 K/uL Final    Eos # 06/01/2024 0.0  0.0 - 0.5 K/uL Final    Baso # 06/01/2024 0.03  0.00 - 0.20 K/uL Final    nRBC 06/01/2024 0  0 /100 WBC Final    Gran % 06/01/2024 90.8 (H)  38.0 - 73.0 % Final    Lymph % 06/01/2024 3.2 (L)  18.0 - 48.0 % Final    Mono % 06/01/2024 5.0  4.0 - 15.0 % Final    Eosinophil % 06/01/2024 0.0  0.0 - 8.0 % Final    Basophil % 06/01/2024 0.3  0.0 - 1.9 % Final    Platelet Estimate 06/01/2024 Appears normal   Final    Poik 06/01/2024 Slight   Final    Poly 06/01/2024 Occasional   Final    Stomatocytes 06/01/2024 Present   Final    Differential Method 06/01/2024 Automated   Final    Sodium 06/01/2024 138  136 - 145 mmol/L Final    Potassium 06/01/2024 3.9  3.5 - 5.1 mmol/L Final    Chloride 06/01/2024 99  95 - 110 mmol/L Final    CO2 06/01/2024 27  23  - 29 mmol/L Final    Glucose 06/01/2024 111 (H)  70 - 110 mg/dL Final    BUN 06/01/2024 24 (H)  6 - 20 mg/dL Final    Creatinine 06/01/2024 1.2  0.5 - 1.4 mg/dL Final    Calcium 06/01/2024 9.9  8.7 - 10.5 mg/dL Final    Total Protein 06/01/2024 8.4  6.0 - 8.4 g/dL Final    Albumin 06/01/2024 5.2  3.5 - 5.2 g/dL Final    Total Bilirubin 06/01/2024 2.4 (H)  0.1 - 1.0 mg/dL Final    Alkaline Phosphatase 06/01/2024 48 (L)  55 - 135 U/L Final    AST 06/01/2024 24  10 - 40 U/L Final    ALT 06/01/2024 37  10 - 44 U/L Final    eGFR 06/01/2024 >60.0  >60 mL/min/1.73 m^2 Final    Anion Gap 06/01/2024 12  8 - 16 mmol/L Final    Lipase 06/01/2024 24  4 - 60 U/L Final    Specimen UA 06/01/2024 Urine, Clean Catch   Final    Color, UA 06/01/2024 Yellow  Yellow, Straw, Aleajndra Final    Appearance, UA 06/01/2024 Clear  Clear Final    pH, UA 06/01/2024 7.0  5.0 - 8.0 Final    Specific Gravity, UA 06/01/2024 1.030  1.005 - 1.030 Final    Protein, UA 06/01/2024 Negative  Negative Final    Glucose, UA 06/01/2024 Negative  Negative Final    Ketones, UA 06/01/2024 Negative  Negative Final    Bilirubin (UA) 06/01/2024 Negative  Negative Final    Occult Blood UA 06/01/2024 Negative  Negative Final    Nitrite, UA 06/01/2024 Negative  Negative Final    Urobilinogen, UA 06/01/2024 Negative  Negative EU/dL Final    Leukocytes, UA 06/01/2024 Negative  Negative Final       Past Medical History:   Diagnosis Date    Hyperlipidemia     Hypertension      Past Surgical History:   Procedure Laterality Date    WISDOM TOOTH EXTRACTION       Family History   Problem Relation Name Age of Onset    Stroke Mother Cammie Chin 55    Hypertension Mother Cammie Chin     Depression Mother Cammie Chin     Heart disease Maternal Grandfather Rian Hahn     No Known Problems Father      Breast cancer Sister Jacque Zafar 38    Cancer Sister Jacque Andrade     Breast cancer Paternal Grandmother         Marital Status:   Alcohol History:  reports current alcohol  "use of about 2.0 standard drinks of alcohol per week.  Tobacco History:  reports that he quit smoking about 14 years ago. His smoking use included cigarettes. He started smoking about 29 years ago. He has never used smokeless tobacco.  Drug History:  reports current drug use. Frequency: 7.00 times per week. Drug: Marijuana.    Review of patient's allergies indicates:  No Known Allergies  Current Medications[1]    Objective:      Vitals:    04/08/25 1345   BP: 124/80   Pulse: 84   Resp: 18   SpO2: 97%   Weight: 109.3 kg (241 lb)   Height: 5' 10" (1.778 m)     Physical Exam    General: No acute distress. Well-developed. Well-nourished.  Eyes: EOMI. Sclerae anicteric.  HENT: Normocephalic. Atraumatic. Nares patent. Moist oral mucosa.  Ears: Bilateral TMs clear. Bilateral EACs clear.  Cardiovascular: Regular rate. Regular rhythm. No murmurs. No rubs. No gallops. Normal S1, S2.  Respiratory: Normal respiratory effort. Clear to auscultation bilaterally. No rales. No rhonchi. No wheezing.  Abdomen: Soft. Non-tender. Non-distended. Normoactive bowel sounds.  Musculoskeletal: No  obvious deformity.  Extremities: No lower extremity edema.  Neurological: Alert & oriented x3. No slurred speech. Normal gait.  Psychiatric: Normal mood. Normal affect. Good insight. Good judgment.  Skin: Warm. Dry. No rash.         Assessment:       Assessment & Plan    - Reviewed sleep study status; home sleep study was non-diagnostic. Considered in-lab polysomnogram.  - Acid reflux management; Protonix PRN appears effective.  - Evaluated current Lexapro regimen; considered potential benefits of nighttime dosing.  - Occasional palpitations; previous workup showed no alarming findings. Identified as likely benign premature atrial contractions (PACs).  - Skin lesions; previous dermatology evaluation in August 2023 identified a dermatofibroma on left leg.  - Considered possibility of fungal skin infection based on description of increased red bumps " and scratches.    ATRIAL PREMATURE DEPOLARIZATION (PREMATURE ATRIAL CONTRACTIONS):  - Explained that premature atrial contractions (PACs) are common and often benign.  - Auscultated the patient's heart and found it to be normal.  - Noted that previous tests including Holter monitor and stress test have been performed.  - Ordered a Zio patch for 5-day heart monitoring to further evaluate palpitations, particularly those occurring when the patient lies on the left side.  - Informed the patient that the heart center will contact them to set up the monitor.  - Advised the patient to monitor the frequency of palpitations and report if they become more frequent.  - Discussed potential impact of caffeine on palpitations.    MAJOR DEPRESSIVE DISORDER:  - Continued Lexapro (escitalopram) at the current dose for depression management.  - Recommend the patient consider switching Lexapro dosing to nighttime to potentially improve sleep quality.    ANXIETY DISORDER:  - Continued Xanax (alprazolam) at current low dose for occasional use as a sleep aid after multiple nights of poor rest.    GASTROESOPHAGEAL REFLUX DISEASE:  - Continued Protonix (pantoprazole) PRN for acid reflux management.  - Advised the patient to contact the office if needing Protonix refills.  - Recommend lifestyle changes such as avoiding late-night eating and identifying trigger foods, noting that acid reflux is triggered by late-night eating and drinking beer.  - Aleoj to continue avoiding late-night eating and irritating foods to manage acid reflux.    URTICARIA (SKIN RASH):  - Noted that the patient reports experiencing a rash after drinking, but it's not consistent.  - Recommend scheduling follow-up with dermatologist for full body skin exam.  - Examined a lesion on the patient's skin and referred to previous dermatology notes.  - Suggested the possibility of fungal infections.  - Prescribed Lotrisone cream for potential fungal infections.  - Noted an  increase in scratches and small red bumps on the skin, possibly related to the dog sleeping in the bed.    FOLLOW-UP AND GENERAL CARE:  - Alejo to message pulmonologist through [employer] to inquire about sleep study status and next steps.  - Ordered annual lab work.  - Follow up once blood test results are available.  - Follow up in 1 year for routine follow-up, or sooner if urgent issues arise.       Plan:       Palpitations  -     Cardiac Monitor - 3-15 Day Adult (Cupid Only); Future      No follow-ups on file.    This note was generated with the assistance of ambient listening technology. Verbal consent was obtained by the patient and accompanying visitor(s) for the recording of patient appointment to facilitate this note. I attest to having reviewed and edited the generated note for accuracy, though some syntax or spelling errors may persist. Please contact the author of this note for any clarification.          4/8/2025 Freddy Arana PA-C           [1]   Current Outpatient Medications:     ALPRAZolam (XANAX) 0.25 MG tablet, Take 1 tablet (0.25 mg total) by mouth 2 (two) times daily as needed for Anxiety., Disp: 30 tablet, Rfl: 0    EScitalopram oxalate (LEXAPRO) 20 MG tablet, Take 1 tablet (20 mg total) by mouth once daily., Disp: 90 tablet, Rfl: 1    tadalafiL (CIALIS) 20 MG Tab, Take 1 tablet (20 mg total) by mouth daily as needed (ED)., Disp: 12 tablet, Rfl: 2    pantoprazole (PROTONIX) 40 MG tablet, Take 1 tablet (40 mg total) by mouth once daily. (Patient not taking: Reported on 4/8/2025), Disp: 90 tablet, Rfl: 3

## 2025-05-16 ENCOUNTER — PATIENT MESSAGE (OUTPATIENT)
Dept: FAMILY MEDICINE | Facility: CLINIC | Age: 40
End: 2025-05-16
Payer: COMMERCIAL

## 2025-05-16 DIAGNOSIS — F41.9 ANXIETY: ICD-10-CM

## 2025-05-16 RX ORDER — ALPRAZOLAM 0.25 MG/1
0.25 TABLET ORAL NIGHTLY PRN
Qty: 30 TABLET | Refills: 1 | Status: SHIPPED | OUTPATIENT
Start: 2025-05-16 | End: 2025-07-15

## 2025-05-16 NOTE — TELEPHONE ENCOUNTER
Im good with 0.25mg prn. Still dont want him to take every night if can avoid it. #30 with 1 refills

## 2025-07-22 ENCOUNTER — PATIENT MESSAGE (OUTPATIENT)
Dept: FAMILY MEDICINE | Facility: CLINIC | Age: 40
End: 2025-07-22
Payer: COMMERCIAL

## 2025-07-25 ENCOUNTER — PATIENT MESSAGE (OUTPATIENT)
Dept: FAMILY MEDICINE | Facility: CLINIC | Age: 40
End: 2025-07-25
Payer: COMMERCIAL

## 2025-08-03 ENCOUNTER — PATIENT MESSAGE (OUTPATIENT)
Dept: FAMILY MEDICINE | Facility: CLINIC | Age: 40
End: 2025-08-03
Payer: COMMERCIAL

## 2025-08-04 ENCOUNTER — OFFICE VISIT (OUTPATIENT)
Dept: FAMILY MEDICINE | Facility: CLINIC | Age: 40
End: 2025-08-04
Payer: COMMERCIAL

## 2025-08-04 ENCOUNTER — PATIENT MESSAGE (OUTPATIENT)
Dept: FAMILY MEDICINE | Facility: CLINIC | Age: 40
End: 2025-08-04

## 2025-08-04 VITALS
DIASTOLIC BLOOD PRESSURE: 70 MMHG | SYSTOLIC BLOOD PRESSURE: 124 MMHG | HEART RATE: 81 BPM | RESPIRATION RATE: 18 BRPM | BODY MASS INDEX: 31.21 KG/M2 | WEIGHT: 218 LBS | OXYGEN SATURATION: 98 % | HEIGHT: 70 IN

## 2025-08-04 DIAGNOSIS — N40.1 BENIGN PROSTATIC HYPERPLASIA WITH WEAK URINARY STREAM: ICD-10-CM

## 2025-08-04 DIAGNOSIS — R39.12 BENIGN PROSTATIC HYPERPLASIA WITH WEAK URINARY STREAM: ICD-10-CM

## 2025-08-04 DIAGNOSIS — Z79.899 ENCOUNTER FOR LONG-TERM (CURRENT) USE OF MEDICATIONS: Primary | ICD-10-CM

## 2025-08-04 DIAGNOSIS — R07.89 OTHER CHEST PAIN: ICD-10-CM

## 2025-08-04 DIAGNOSIS — F41.9 ANXIETY: ICD-10-CM

## 2025-08-04 RX ORDER — ESCITALOPRAM OXALATE 10 MG/1
10 TABLET ORAL DAILY
Qty: 90 TABLET | Refills: 0 | Status: SHIPPED | OUTPATIENT
Start: 2025-08-04 | End: 2025-11-02

## 2025-08-04 NOTE — PROGRESS NOTES
"  SUBJECTIVE:    Patient ID: Alejo Zafar is a 39 y.o. male.    Chief Complaint: Follow-up (No bottles// no refills needed// pt states he's been having to get up about 3 times to use the restroom going on for a very long time//would like to lower dose on lexapro// also says he felt a sharp discomfort feeling in chest on yesterday after working out no pain //last taken xanax was 2 months ago)    History of Present Illness    CHIEF COMPLAINT:  Alejo presents today for follow up and medication management, specifically requesting to wean off Lexapro completely    CHEST DISCOMFORT:  He reports a recent episode of chest discomfort during light exercise on the treadmill that was more intense than previous experiences, describing it as sharp and noting it "took his breath away." The episode occurred while cooling down at a slow pace of 2.5 miles per hour. He appears aware of and concerned about the chest discomfort's characteristics and potential significance.    GENITOURINARY:  He reports nocturia, waking 1-2 times per night to urinate. He describes a weak urine stream with significant delay in urinary flow and requires manual perineal pressure to initiate and complete bladder emptying. He experiences post-void dribbling and incomplete bladder emptying. He has a history of prohormone use and expresses concern about potential prostate impact. He denies urinary incontinence, burning, or pain with urination. He has reduced fluid intake before bedtime with minimal improvement in nighttime urinary frequency.    WEIGHT MANAGEMENT:  He reports significant weight loss from 250 lbs to 218 lbs through dietary modification. He is currently following a ketogenic diet with high protein intake and minimal vegetable consumption. He takes multivitamin supplements to support nutritional needs while on the diet. He appears motivated to continue weight loss and maintain dietary changes, noting previous success with a similar dietary " approach during a period of intense bodybuilding.    CURRENT MEDICATIONS:  He is currently taking Lexapro 20 mg and expresses desire to discontinue and wean off the medication. He reports occasional medication non-adherence in the past with other medications and verbalizes dislike of his current medication regimen.    MEDICAL HISTORY:  Stress test from 2021 revealed slight ST segment changes in recovery, which was not considered ischemic by previous provider. Previous Holter monitor demonstrated sinus tachycardia during exercise with few premature ventricular contractions (PVCs). Prior sleep study was negative for sleep apnea.      ROS:  General: -fever, -chills, -fatigue, -weight gain, -weight loss  Eyes: -vision changes, -redness, -discharge  ENT: -ear pain, -nasal congestion, -sore throat  Cardiovascular: -chest pain, +palpitations, -lower extremity edema, +chest pain at rest  Respiratory: -cough, -shortness of breath  Gastrointestinal: -abdominal pain, -nausea, -vomiting, -diarrhea, -constipation, -blood in stool  Genitourinary: -dysuria, -hematuria, -frequency, +nocturia, +difficulty urinating, +incomplete emptying, +post-urination dribbling  Musculoskeletal: -joint pain, -muscle pain  Skin: -rash, -lesion  Neurological: -headache, -dizziness, -numbness, -tingling  Psychiatric: -anxiety, -depression, -sleep difficulty         No visits with results within 6 Month(s) from this visit.   Latest known visit with results is:   Admission on 06/01/2024, Discharged on 06/01/2024   Component Date Value Ref Range Status    WBC 06/01/2024 10.54  3.90 - 12.70 K/uL Final    RBC 06/01/2024 6.11  4.60 - 6.20 M/uL Final    Hemoglobin 06/01/2024 16.4  14.0 - 18.0 g/dL Final    Hematocrit 06/01/2024 49.1  40.0 - 54.0 % Final    MCV 06/01/2024 80 (L)  82 - 98 fL Final    MCH 06/01/2024 26.8 (L)  27.0 - 31.0 pg Final    MCHC 06/01/2024 33.4  32.0 - 36.0 g/dL Final    RDW 06/01/2024 17.2 (H)  11.5 - 14.5 % Final    Platelets  06/01/2024 233  150 - 450 K/uL Final    MPV 06/01/2024 11.9  9.2 - 12.9 fL Final    Immature Granulocytes 06/01/2024 0.7 (H)  0.0 - 0.5 % Final    Gran # (ANC) 06/01/2024 9.6 (H)  1.8 - 7.7 K/uL Final    Immature Grans (Abs) 06/01/2024 0.07 (H)  0.00 - 0.04 K/uL Final    Lymph # 06/01/2024 0.3 (L)  1.0 - 4.8 K/uL Final    Mono # 06/01/2024 0.5  0.3 - 1.0 K/uL Final    Eos # 06/01/2024 0.0  0.0 - 0.5 K/uL Final    Baso # 06/01/2024 0.03  0.00 - 0.20 K/uL Final    nRBC 06/01/2024 0  0 /100 WBC Final    Gran % 06/01/2024 90.8 (H)  38.0 - 73.0 % Final    Lymph % 06/01/2024 3.2 (L)  18.0 - 48.0 % Final    Mono % 06/01/2024 5.0  4.0 - 15.0 % Final    Eosinophil % 06/01/2024 0.0  0.0 - 8.0 % Final    Basophil % 06/01/2024 0.3  0.0 - 1.9 % Final    Platelet Estimate 06/01/2024 Appears normal   Final    Poik 06/01/2024 Slight   Final    Poly 06/01/2024 Occasional   Final    Stomatocytes 06/01/2024 Present   Final    Differential Method 06/01/2024 Automated   Final    Sodium 06/01/2024 138  136 - 145 mmol/L Final    Potassium 06/01/2024 3.9  3.5 - 5.1 mmol/L Final    Chloride 06/01/2024 99  95 - 110 mmol/L Final    CO2 06/01/2024 27  23 - 29 mmol/L Final    Glucose 06/01/2024 111 (H)  70 - 110 mg/dL Final    BUN 06/01/2024 24 (H)  6 - 20 mg/dL Final    Creatinine 06/01/2024 1.2  0.5 - 1.4 mg/dL Final    Calcium 06/01/2024 9.9  8.7 - 10.5 mg/dL Final    Total Protein 06/01/2024 8.4  6.0 - 8.4 g/dL Final    Albumin 06/01/2024 5.2  3.5 - 5.2 g/dL Final    Total Bilirubin 06/01/2024 2.4 (H)  0.1 - 1.0 mg/dL Final    Alkaline Phosphatase 06/01/2024 48 (L)  55 - 135 U/L Final    AST 06/01/2024 24  10 - 40 U/L Final    ALT 06/01/2024 37  10 - 44 U/L Final    eGFR 06/01/2024 >60.0  >60 mL/min/1.73 m^2 Final    Anion Gap 06/01/2024 12  8 - 16 mmol/L Final    Lipase 06/01/2024 24  4 - 60 U/L Final    Specimen UA 06/01/2024 Urine, Clean Catch   Final    Color, UA 06/01/2024 Yellow  Yellow, Straw, Alejandra Final    Appearance, UA 06/01/2024  "Clear  Clear Final    pH, UA 06/01/2024 7.0  5.0 - 8.0 Final    Specific Gravity, UA 06/01/2024 1.030  1.005 - 1.030 Final    Protein, UA 06/01/2024 Negative  Negative Final    Glucose, UA 06/01/2024 Negative  Negative Final    Ketones, UA 06/01/2024 Negative  Negative Final    Bilirubin (UA) 06/01/2024 Negative  Negative Final    Occult Blood UA 06/01/2024 Negative  Negative Final    Nitrite, UA 06/01/2024 Negative  Negative Final    Urobilinogen, UA 06/01/2024 Negative  Negative EU/dL Final    Leukocytes, UA 06/01/2024 Negative  Negative Final       Past Medical History:   Diagnosis Date    Hyperlipidemia     Hypertension      Past Surgical History:   Procedure Laterality Date    WISDOM TOOTH EXTRACTION       Family History   Problem Relation Name Age of Onset    Stroke Mother Cammie Chin 55    Hypertension Mother Cammie Chin     Depression Mother Cammie Chin     Heart disease Maternal Grandfather Rian Hahn     No Known Problems Father      Breast cancer Sister Jacque Zafar 38    Cancer Sister Jacque Zafar     Breast cancer Paternal Grandmother         Marital Status:   Alcohol History:  reports current alcohol use of about 2.0 standard drinks of alcohol per week.  Tobacco History:  reports that he quit smoking about 14 years ago. His smoking use included cigarettes. He started smoking about 29 years ago. He has never used smokeless tobacco.  Drug History:  reports current drug use. Frequency: 7.00 times per week. Drug: Marijuana.    Review of patient's allergies indicates:  No Known Allergies  Current Medications[1]    Objective:      Vitals:    08/04/25 1041   BP: 124/70   Pulse: 81   Resp: 18   SpO2: 98%   Weight: 98.9 kg (218 lb)   Height: 5' 10" (1.778 m)     Physical Exam    Vitals: Weight: 218 lbs. Blood pressure: 124/70.  General: No acute distress. Well-developed. Well-nourished.  Eyes: EOMI. Sclerae anicteric.  HENT: Normocephalic. Atraumatic. Nares patent. Moist oral mucosa.  Ears: " Bilateral TMs clear. Bilateral EACs clear.  Cardiovascular: Regular rate. Regular rhythm. No murmurs. No rubs. No gallops. Normal S1, S2.  Respiratory: Normal respiratory effort. Clear to auscultation bilaterally. No rales. No rhonchi. No wheezing.  Abdomen: Soft. Non-tender. Non-distended. Normoactive bowel sounds.  Musculoskeletal: No  obvious deformity.  Extremities: No lower extremity edema.  Neurological: Alert & oriented x3. No slurred speech. Normal gait.  Psychiatric: Normal mood. Normal affect. Good insight. Good judgment.  Skin: Warm. Dry. No rash.         Assessment:       Assessment & Plan    - Reviewed significant weight loss (from 250 lbs to 218 lbs) and current keto diet.  - BP well-controlled at 124/70.  - Previous testosterone levels from 2020 (560 ng/dL, WNL of 250-780 ng/dL).  - Evaluated urinary symptoms, including weak stream, incomplete emptying, and nocturia.  - Considered history of prohormone use and potential impact on prostate health.  - Reviewed previous cardiac workup, including 2021 stress test showing slight upsloping of ST segment in recovery.  - Determined need for more sensitive nuclear stress test due to recurring chest discomfort.  - Previous Holter monitor results showing occasional PVCs and sinus tachycardia during exercise.    ## CHEST PAIN:  - Alejo reports chest discomfort similar to previous episodes, with increased intensity and sharpness, occurring post-exercise during a light walk on the treadmill.  - Reviewed previous stress test from 2021 which showed slight upsloping of the ST segment in recovery, not considered ischemic by Dr. Clinton.  - Discussed the need for a more sensitive nuclear test to evaluate the chest discomfort further.  - Ordered nuclear stress test to evaluate cardiac function, ensure proper wall motion and blood flow.  - Will refer to cardiologist for further evaluation if nuclear stress test is abnormal and advise on next steps after receiving  results.    ## VENTRICULAR PREMATURE DEPOLARIZATION (PVCS):  - Alejo reports experiencing PVCs during previous episodes of chest discomfort.  - Reviewed previous Holter monitor which showed a few PVCs, but nothing alarming.  - Discussed these findings with patient.  - Cardiac function will be further evaluated with the ordered nuclear stress test.  - Will refer to cardiologist if results are abnormal and advise on next steps after receiving test results.    ## ABNORMAL ELECTROCARDIOGRAM (ECG/EKG):  - Previous stress test from 2021 showed slight upsloping of the ST segment in recovery, which was not considered ischemic by Dr. Clinton.  - Discussed these findings and the need for a more sensitive nuclear test for further evaluation.  - Nuclear stress test ordered as noted above to evaluate cardiac function.  - Will refer to cardiologist if results are abnormal.    ## TACHYCARDIA:  - Alejo experienced sinus tachycardia during gym activities as documented in previous Holter monitor results.  - Discussed these findings with patient.  - Cardiac function will be further evaluated with the ordered nuclear stress test.  - Will refer to cardiologist if results are abnormal.    ## MAJOR DEPRESSIVE DISORDER:  - Alejo is currently on Lexapro 20 mg and wants to wean off completely.  - Alejo feels ready to stop Lexapro as they are in a good place, doing all the right things, and body's natural endorphins are kicking in.  - Discussed weaning process and prescribed tapering schedule: 10 mg daily for 1 month, then 5 mg daily for 1 month, then 5 mg every other day for 3 weeks, then discontinue.  - Ordered comprehensive lab work including chemistry panel and thyroid function.    ## NOCTURIA:  - Alejo reports waking up 1-2 times at night to urinate, which is an improvement from 2-4 times previously.  - This frequency is considered average.  - Discussed this improvement with patient.    ## URINARY ISSUES (HESITANCY, POOR STREAM,  INCOMPLETE EMPTYING, POST-VOID DRIBBLING):  - Alejo reports weak urinary stream, hesitancy, feeling of incomplete bladder emptying, and post-void dribbling.  - He needs to push on the perineum to urinate.  - Discussed these symptoms and referred patient to Dr. Fowler in Urology for comprehensive evaluation of these urinary symptoms.  - Will advise on next steps after receiving urological evaluation resu  lts.    ## WEIGHT MANAGEMENT AND GENERAL HEALTH:  - Alejo's weight reduced from 250 lbs to 218 lbs due to diet and exercise.  - Blood pressure is beautifully controlled at 124/70, indicating improved health status.  - Alejo is on a keto diet with a cheat day and is supplementing with multivitamins.  - Discussed optimizing health with continued diet and exercise, and patient's interest in checking testosterone and other biomarkers.  - Ordered comprehensive lab work including chemistry panel, renal function, liver function, blood counts, cholesterol, thyroid function, urinalysis, testosterone level, and A1C.    ## FOLLOW-UP:  - Follow up in 6 months.  - Contact office if need to be seen sooner.       Plan:       Encounter for long-term (current) use of medications  -     HEMOGLOBIN A1C; Future; Expected date: 08/04/2025  -     Testosterone, Total, Males; Future; Expected date: 08/04/2025    Benign prostatic hyperplasia with weak urinary stream  -     Ambulatory referral/consult to Urology; Future; Expected date: 08/04/2025    Anxiety  Comments:  stable, continue as is with prn use of alprazolam.  Orders:  -     EScitalopram oxalate (LEXAPRO) 10 MG tablet; Take 1 tablet (10 mg total) by mouth once daily.  Dispense: 90 tablet; Refill: 0    Other chest pain  -     Nuclear Stress - Cardiology Interpreted; Future      Follow up in about 6 months (around 2/4/2026).    This note was generated with the assistance of ambient listening technology. Verbal consent was obtained by the patient and accompanying visitor(s) for the  recording of patient appointment to facilitate this note. I attest to having reviewed and edited the generated note for accuracy, though some syntax or spelling errors may persist. Please contact the author of this note for any clarification.          8/4/2025 Freddy Arana PA-C           [1]   Current Outpatient Medications:     ALPRAZolam (XANAX) 0.25 MG tablet, Take 1 tablet (0.25 mg total) by mouth nightly as needed for Anxiety., Disp: 30 tablet, Rfl: 1    tadalafiL (CIALIS) 20 MG Tab, Take 1 tablet (20 mg total) by mouth daily as needed (ED)., Disp: 12 tablet, Rfl: 2    EScitalopram oxalate (LEXAPRO) 10 MG tablet, Take 1 tablet (10 mg total) by mouth once daily., Disp: 90 tablet, Rfl: 0

## 2025-08-05 LAB
HBA1C MFR BLD: <4.2 %
TESTOST SERPL-MCNC: 711 NG/DL (ref 250–827)

## 2025-08-07 ENCOUNTER — PATIENT MESSAGE (OUTPATIENT)
Dept: FAMILY MEDICINE | Facility: CLINIC | Age: 40
End: 2025-08-07
Payer: COMMERCIAL

## 2025-08-07 ENCOUNTER — OFFICE VISIT (OUTPATIENT)
Dept: DERMATOLOGY | Facility: CLINIC | Age: 40
End: 2025-08-07
Payer: COMMERCIAL

## 2025-08-07 DIAGNOSIS — E55.9 VITAMIN D DEFICIENCY: Primary | ICD-10-CM

## 2025-08-07 DIAGNOSIS — L90.5 SCAR: ICD-10-CM

## 2025-08-07 DIAGNOSIS — Z86.018 HISTORY OF DYSPLASTIC NEVUS: ICD-10-CM

## 2025-08-07 DIAGNOSIS — D23.9 DERMATOFIBROMA: ICD-10-CM

## 2025-08-07 DIAGNOSIS — L82.1 SEBORRHEIC KERATOSIS: ICD-10-CM

## 2025-08-07 DIAGNOSIS — D22.9 MULTIPLE BENIGN NEVI: Primary | ICD-10-CM

## 2025-08-07 DIAGNOSIS — D18.01 CHERRY ANGIOMA: ICD-10-CM

## 2025-08-07 NOTE — PROGRESS NOTES
Subjective:      Patient ID:  Alejo Zafar is a 39 y.o. male who presents for   Chief Complaint   Patient presents with    Skin Check     TBSX     LOV 08/23/2023    Patient is coming in for [facility].   Possible genital wart  Spots on right legs  Spot on right arm    Derm Hx:  Phx  Mildly atypical nevus, mid upper abdomen, monitor   Denies Fhx MM        Review of Systems   Constitutional:  Negative for fever, chills and fatigue.   Respiratory:  Negative for cough and shortness of breath.    Gastrointestinal:  Negative for nausea and vomiting.   Skin:  Positive for daily sunscreen use, activity-related sunscreen use and wears hat.   Hematologic/Lymphatic: Does not bruise/bleed easily.       Objective:   Physical Exam   Constitutional: He appears well-developed and well-nourished. No distress.   Neurological: He is alert and oriented to person, place, and time. He is not disoriented.   Psychiatric: He has a normal mood and affect.   Skin:   Areas Examined (abnormalities noted in diagram):   Scalp / Hair Palpated and Inspected  Head / Face Inspection Performed  Neck Inspection Performed  Chest / Axilla Inspection Performed  Abdomen Inspection Performed  Genitals / Buttocks / Groin Inspection Performed  Back Inspection Performed  RUE Inspected  LUE Inspection Performed  RLE Inspected  LLE Inspection Performed  Nails and Digits Inspection Performed                         Diagram Legend     Erythematous scaling macule/papule c/w actinic keratosis       Vascular papule c/w angioma      Pigmented verrucoid papule/plaque c/w seborrheic keratosis      Yellow umbilicated papule c/w sebaceous hyperplasia      Irregularly shaped tan macule c/w lentigo     1-2 mm smooth white papules consistent with Milia      Movable subcutaneous cyst with punctum c/w epidermal inclusion cyst      Subcutaneous movable cyst c/w pilar cyst      Firm pink to brown papule c/w dermatofibroma      Pedunculated fleshy papule(s) c/w skin tag(s)       Evenly pigmented macule c/w junctional nevus     Mildly variegated pigmented, slightly irregular-bordered macule c/w mildly atypical nevus      Flesh colored to evenly pigmented papule c/w intradermal nevus       Pink pearly papule/plaque c/w basal cell carcinoma      Erythematous hyperkeratotic cursted plaque c/w SCC      Surgical scar with no sign of skin cancer recurrence      Open and closed comedones      Inflammatory papules and pustules      Verrucoid papule consistent consistent with wart     Erythematous eczematous patches and plaques     Dystrophic onycholytic nail with subungual debris c/w onychomycosis     Umbilicated papule    Erythematous-base heme-crusted tan verrucoid plaque consistent with inflamed seborrheic keratosis     Erythematous Silvery Scaling Plaque c/w Psoriasis     See annotation      Assessment / Plan:        Multiple benign nevi  History of dysplastic nevus  Scar  Careful dermoscopy evaluation of nevi performed with none identified as needing biopsy today  Monitor for new mole or moles that are becoming bigger, darker, irritated, or developing irregular borders.   Area of previous dysplastic nevus examined. Site well healed with no signs of recurrence.  Total body skin examination performed today including at least 12 points as noted in physical examination. No lesions suspicious for malignancy noted.  Patient instructed in importance in daily broad spectrum sun protection of at least spf 30. Mineral sunscreen ingredients preferred (Zinc +/- Titanium) and can be found OTC.   Patient encouraged to wear hat for all outdoor exposure.   Also discussed sun avoidance and use of protective clothing.    Dermatofibroma  This is a benign scar-like lesion secondary to minor trauma. No treatment required.     Seborrheic keratosis  These are benign inherited growths without a malignant potential. Reassurance given to patient. No treatment is necessary.     Cherry angioma  This is a benign vascular  lesion. Reassurance given. No treatment required.            PRN  No follow-ups on file.

## 2025-08-11 ENCOUNTER — OFFICE VISIT (OUTPATIENT)
Dept: UROLOGY | Facility: CLINIC | Age: 40
End: 2025-08-11
Payer: COMMERCIAL

## 2025-08-11 ENCOUNTER — LAB VISIT (OUTPATIENT)
Dept: LAB | Facility: HOSPITAL | Age: 40
End: 2025-08-11
Attending: STUDENT IN AN ORGANIZED HEALTH CARE EDUCATION/TRAINING PROGRAM
Payer: COMMERCIAL

## 2025-08-11 VITALS — HEIGHT: 70 IN | BODY MASS INDEX: 31.21 KG/M2 | WEIGHT: 218 LBS

## 2025-08-11 DIAGNOSIS — R39.12 BENIGN PROSTATIC HYPERPLASIA WITH WEAK URINARY STREAM: ICD-10-CM

## 2025-08-11 DIAGNOSIS — N40.1 BENIGN PROSTATIC HYPERPLASIA WITH WEAK URINARY STREAM: ICD-10-CM

## 2025-08-11 DIAGNOSIS — R39.12 BENIGN PROSTATIC HYPERPLASIA WITH WEAK URINARY STREAM: Primary | ICD-10-CM

## 2025-08-11 DIAGNOSIS — N40.1 BENIGN PROSTATIC HYPERPLASIA WITH WEAK URINARY STREAM: Primary | ICD-10-CM

## 2025-08-11 LAB — PSA SERPL-MCNC: 0.77 NG/ML (ref ?–4)

## 2025-08-11 PROCEDURE — 99214 OFFICE O/P EST MOD 30 MIN: CPT | Mod: S$GLB,,, | Performed by: STUDENT IN AN ORGANIZED HEALTH CARE EDUCATION/TRAINING PROGRAM

## 2025-08-11 PROCEDURE — 3044F HG A1C LEVEL LT 7.0%: CPT | Mod: CPTII,S$GLB,, | Performed by: STUDENT IN AN ORGANIZED HEALTH CARE EDUCATION/TRAINING PROGRAM

## 2025-08-11 PROCEDURE — 99999 PR PBB SHADOW E&M-EST. PATIENT-LVL III: CPT | Mod: PBBFAC,,, | Performed by: STUDENT IN AN ORGANIZED HEALTH CARE EDUCATION/TRAINING PROGRAM

## 2025-08-11 PROCEDURE — 1159F MED LIST DOCD IN RCRD: CPT | Mod: CPTII,S$GLB,, | Performed by: STUDENT IN AN ORGANIZED HEALTH CARE EDUCATION/TRAINING PROGRAM

## 2025-08-11 PROCEDURE — 36415 COLL VENOUS BLD VENIPUNCTURE: CPT

## 2025-08-11 PROCEDURE — 84153 ASSAY OF PSA TOTAL: CPT

## 2025-08-11 PROCEDURE — 3008F BODY MASS INDEX DOCD: CPT | Mod: CPTII,S$GLB,, | Performed by: STUDENT IN AN ORGANIZED HEALTH CARE EDUCATION/TRAINING PROGRAM

## 2025-08-13 ENCOUNTER — PATIENT MESSAGE (OUTPATIENT)
Dept: FAMILY MEDICINE | Facility: CLINIC | Age: 40
End: 2025-08-13
Payer: COMMERCIAL

## 2025-08-13 DIAGNOSIS — F51.01 PRIMARY INSOMNIA: Primary | ICD-10-CM

## 2025-08-13 RX ORDER — DARIDOREXANT 50 MG/1
50 TABLET, FILM COATED ORAL NIGHTLY
Qty: 90 TABLET | Refills: 0 | Status: SHIPPED | OUTPATIENT
Start: 2025-08-13

## 2025-08-20 ENCOUNTER — PATIENT MESSAGE (OUTPATIENT)
Dept: FAMILY MEDICINE | Facility: CLINIC | Age: 40
End: 2025-08-20
Payer: COMMERCIAL

## 2025-08-20 DIAGNOSIS — E66.9 OBESITY, UNSPECIFIED CLASS, UNSPECIFIED OBESITY TYPE, UNSPECIFIED WHETHER SERIOUS COMORBIDITY PRESENT: Primary | ICD-10-CM

## 2025-08-22 RX ORDER — TIRZEPATIDE 2.5 MG/.5ML
2.5 INJECTION, SOLUTION SUBCUTANEOUS
Qty: 2 ML | Refills: 2 | Status: SHIPPED | OUTPATIENT
Start: 2025-08-22 | End: 2025-11-20